# Patient Record
Sex: FEMALE | Race: WHITE | HISPANIC OR LATINO | Employment: FULL TIME | URBAN - METROPOLITAN AREA
[De-identification: names, ages, dates, MRNs, and addresses within clinical notes are randomized per-mention and may not be internally consistent; named-entity substitution may affect disease eponyms.]

---

## 2017-01-27 ENCOUNTER — OFFICE VISIT (OUTPATIENT)
Dept: OBSTETRICS AND GYNECOLOGY | Facility: CLINIC | Age: 57
End: 2017-01-27
Payer: COMMERCIAL

## 2017-01-27 VITALS
WEIGHT: 162.94 LBS | SYSTOLIC BLOOD PRESSURE: 120 MMHG | BODY MASS INDEX: 28.87 KG/M2 | HEIGHT: 63 IN | DIASTOLIC BLOOD PRESSURE: 70 MMHG

## 2017-01-27 DIAGNOSIS — Z01.419 ENCOUNTER FOR GYNECOLOGICAL EXAMINATION: Primary | ICD-10-CM

## 2017-01-27 DIAGNOSIS — Z12.4 PAP SMEAR FOR CERVICAL CANCER SCREENING: ICD-10-CM

## 2017-01-27 DIAGNOSIS — Z12.39 BREAST CANCER SCREENING: ICD-10-CM

## 2017-01-27 PROCEDURE — 88175 CYTOPATH C/V AUTO FLUID REDO: CPT

## 2017-01-27 PROCEDURE — 99999 PR PBB SHADOW E&M-NEW PATIENT-LVL III: CPT | Mod: PBBFAC,,, | Performed by: OBSTETRICS & GYNECOLOGY

## 2017-01-27 PROCEDURE — 99386 PREV VISIT NEW AGE 40-64: CPT | Mod: S$GLB,,, | Performed by: OBSTETRICS & GYNECOLOGY

## 2017-01-27 RX ORDER — CARBAMAZEPINE 100 MG/1
TABLET, EXTENDED RELEASE ORAL
COMMUNITY
Start: 2016-12-28 | End: 2017-02-03 | Stop reason: SDUPTHER

## 2017-01-27 RX ORDER — CARBAMAZEPINE 100 MG/1
100 CAPSULE, EXTENDED RELEASE ORAL 2 TIMES DAILY
Refills: 0 | COMMUNITY
Start: 2017-01-18 | End: 2018-02-14

## 2017-01-27 RX ORDER — CHLORZOXAZONE 500 MG/1
500 TABLET ORAL 3 TIMES DAILY
Refills: 0 | COMMUNITY
Start: 2016-12-28 | End: 2017-02-13

## 2017-01-27 NOTE — MR AVS SNAPSHOT
"    [facility]  [address] [facility], [address]  [facility] [address]  Phone: [phone]  Fax: [phone]                  Nicole Perez   2017 9:30 AM   Office Visit    Description:  Female : 1960   Provider:  Tiffany Ansari MD   Department:  [facility]           Reason for Visit     Gynecologic Exam                To Do List           Future Appointments        Provider Department Dept Phone    2017 8:40 AM Trae Schafer MD [place] - Cardiology [phone]      Goals (5 Years of Data)     None      [facility]     [facility] On Call Nurse Care Line -  Assistance  Registered nurses in the [facility] On Call Center provide clinical advisement, health education, appointment booking, and other advisory services.  Call for this free service at [phone].             Medications           Message regarding Medications     Verify the changes and/or additions to your medication regime listed below are the same as discussed with your clinician today.  If any of these changes or additions are incorrect, please notify your healthcare provider.             Verify that the below list of medications is an accurate representation of the medications you are currently taking.  If none reported, the list may be blank. If incorrect, please contact your healthcare provider. Carry this list with you in case of emergency.           Current Medications     carbamazepine (CARBATROL) 100 MG CM12 Take 100 mg by mouth 2 (two) times daily.    carbamazepine (TEGRETOL XR) 100 MG 12 hr tablet     chlorzoxazone (PARAFON FORTE) 500 mg Tab Take 500 mg by mouth 3 (three) times daily.           Clinical Reference Information           Vital Signs - Last Recorded  Most recent update: 2017  9:45 AM by Kalia Bennett MA    BP Ht Wt BMI       120/70 5' 3" (1.6 m) 73.9 kg (162 lb 14.7 oz) 28.86 kg/m2       Blood Pressure          Most Recent Value    BP  120/70      Allergies as of " 1/27/2017     No Known Allergies      Immunizations Administered on Date of Encounter - 1/27/2017     None      [facility] Sign-Up     Activating your [facility] account is as easy as 1-2-3!     1) Visit [email or web address], select Sign Up Now, enter this activation code and your date of birth, then select Next.  [email or web address]  Expires: 3/13/2017  9:54 AM      2) Create a username and password to use when you visit [facility] in the future and select a security question in case you lose your password and select Next.    3) Enter your e-mail address and click Sign Up!    Additional Information  If you have questions, please e-mail [email or web address] or call [phone] to talk to our [facility] staff. Remember, [facility] is NOT to be used for urgent needs. For medical emergencies, dial 911.

## 2017-01-27 NOTE — PROGRESS NOTES
HPI: Pt is a 56 y.o.  female who presents for routine annual exam. She has hx of prior supracervical hysterectomy 4 years ago for vaginal bleeding. No hx of abnormal paps.       ROS:  GENERAL: Feeling well overall. Denies fever or chills.   SKIN: Denies rash or lesions.   HEAD: Denies head injury or headache.   NODES: Denies enlarged lymph nodes.   CHEST: Denies chest pain or shortness of breath.   CARDIOVASCULAR: Denies palpitations or left sided chest pain.   ABDOMEN: No abdominal pain, constipation, diarrhea, nausea, vomiting or rectal bleeding.   URINARY: No dysuria, hematuria, or burning on urination.  REPRODUCTIVE: See HPI.   BREASTS: Denies pain, lumps, or nipple discharge.   HEMATOLOGIC: No easy bruisability or excessive bleeding.   MUSCULOSKELETAL: Denies joint pain or swelling.   NEUROLOGIC: Denies syncope or weakness.   PSYCHIATRIC: Denies depression, anxiety or mood swings.    PE:   APPEARANCE: Well nourished, well developed, White female in no acute distress.  NODES: no cervical, supraclavicular, or inguinal lymphadenopathy  BREASTS: Symmetrical, no skin changes or visible lesions. No palpable masses, nipple discharge or adenopathy bilaterally.  ABDOMEN: Soft. No tenderness or masses. No distention. No hernias palpated. No CVA tenderness.  VULVA: No lesions. Normal external female genitalia.  URETHRAL MEATUS: Normal size and location, no lesions, no prolapse.  URETHRA: No masses, tenderness, or prolapse.  VAGINA: Moist. No lesions or lacerations noted. No abnormal discharge present. No odor present.   CERVIX: No lesions or discharge. No cervical motion tenderness.   UTERUS: absent.  ADNEXA: No tenderness. No fullness or masses palpated in the adnexal regions.   ANUS PERINEUM: Normal.      Diagnosis:  1. Encounter for gynecological examination    2. Pap smear for cervical cancer screening    3. Breast cancer screening        Plan:     Orders Placed This Encounter    Mammo Digital Screening Bilat with  Tomosynthesis CAD    Liquid-based pap smear, screening       Patient was counseled today on the new ACS guidelines for cervical cytology screening as well as the current recommendations for breast cancer screening. She was counseled to follow up with her PCP for other routine health maintenance. Counseling session lasted approximately 10 minutes, and all her questions were answered.    Follow-up with me in 1 year for routine exam; pap in 3 years. Pt aware.

## 2017-02-03 ENCOUNTER — OFFICE VISIT (OUTPATIENT)
Dept: INTERNAL MEDICINE | Facility: CLINIC | Age: 57
End: 2017-02-03
Payer: COMMERCIAL

## 2017-02-03 ENCOUNTER — HOSPITAL ENCOUNTER (OUTPATIENT)
Dept: RADIOLOGY | Facility: HOSPITAL | Age: 57
Discharge: HOME | End: 2017-02-03
Attending: OBSTETRICS & GYNECOLOGY
Payer: COMMERCIAL

## 2017-02-03 VITALS
BODY MASS INDEX: 30.02 KG/M2 | SYSTOLIC BLOOD PRESSURE: 128 MMHG | HEART RATE: 86 BPM | DIASTOLIC BLOOD PRESSURE: 60 MMHG | WEIGHT: 163.13 LBS | HEIGHT: 62 IN | TEMPERATURE: 98 F

## 2017-02-03 DIAGNOSIS — Z12.39 BREAST CANCER SCREENING: ICD-10-CM

## 2017-02-03 DIAGNOSIS — M62.838 MUSCLE SPASM: ICD-10-CM

## 2017-02-03 DIAGNOSIS — Z11.59 NEED FOR HEPATITIS C SCREENING TEST: ICD-10-CM

## 2017-02-03 DIAGNOSIS — R13.10 DYSPHAGIA, UNSPECIFIED TYPE: ICD-10-CM

## 2017-02-03 DIAGNOSIS — R14.0 BLOATING: ICD-10-CM

## 2017-02-03 DIAGNOSIS — A04.8 H. PYLORI INFECTION: ICD-10-CM

## 2017-02-03 DIAGNOSIS — Z01.00 ROUTINE EYE EXAM: ICD-10-CM

## 2017-02-03 DIAGNOSIS — Z00.00 ANNUAL PHYSICAL EXAM: Primary | ICD-10-CM

## 2017-02-03 DIAGNOSIS — Z12.31 VISIT FOR SCREENING MAMMOGRAM: ICD-10-CM

## 2017-02-03 DIAGNOSIS — M79.89 SWELLING OF LOWER EXTREMITY: ICD-10-CM

## 2017-02-03 PROCEDURE — 99386 PREV VISIT NEW AGE 40-64: CPT | Mod: S$GLB,,, | Performed by: INTERNAL MEDICINE

## 2017-02-03 PROCEDURE — 99999 PR PBB SHADOW E&M-EST. PATIENT-LVL IV: CPT | Mod: PBBFAC,,, | Performed by: INTERNAL MEDICINE

## 2017-02-03 PROCEDURE — 77063 BREAST TOMOSYNTHESIS BI: CPT | Mod: 26,,, | Performed by: RADIOLOGY

## 2017-02-03 PROCEDURE — 77067 SCR MAMMO BI INCL CAD: CPT | Mod: 26,,, | Performed by: RADIOLOGY

## 2017-02-03 PROCEDURE — 77067 SCR MAMMO BI INCL CAD: CPT | Mod: TC

## 2017-02-03 NOTE — PROGRESS NOTES
Subjective:       Patient ID: Nicole Perez is a 56 y.o. female.    Chief Complaint: Annual Exam    HPI     Patient is a 56 year old female here today for annual physical exam.      1. Feet Swelling:  Bilateral foot swelling  BP controlled  Does not monitor sodium intake  She says she saw a vascular MD in [place] who says she some valvular problem in the veins and wants to follow up. She has doppler which was negative for DVT  Also wants compression hose Rx    2. Muscle Spasm:  Diffuse muscle spasms for years. She saw neurologist in [facility], s/p CT and MRI reportedly normal. No known cause. She is currently on 2 meds on the MAR for this problem.     3. Abdominal Bloating:  S/p cholecystectomy  Lactose intolerant  Normal BM for her is once a day, soft, brown  No diarrhea, no blood in stool, no GERD, no nausea/vomiting  S/p EGD and Colonoscopy, says she had some polyps and also H.Pylori and was treated for this. She would like to see GI for this problem    3. Dysphagia:  Reports that one time while in the shower, water entered her mouth causing a laryngospasm which then resolved but since then she has intermittent episodes with aspiration during the night, choking on her saliva. Wants evaluation for this.      Health Maintenance:  Cholesterol: (q5yr>19yo) needs   Vaccines: Influenza (yearly) declines ; Tetanus (every 10 yrs - 1st tdap) declines   Sexual Screening:    STD screening: no concern  Eye exam: OTC reading glasses; last eye exam was long time ago  Mammogram: (to age 75yo) needs   Gyn exam: last pap smear  - normal   Colonoscopy: due now again       Exercise: no regular exercise   Diet: no restrictions     Risk factors    Past Medical History   Diagnosis Date    Abnormal Pap smear of cervix      Past Surgical History   Procedure Laterality Date    Hysterectomy       section      Breast reduction Bilateral     Cholecystectomy      Tummy tuck Bilateral      Social History      Social History    Marital status:      Spouse name: N/A    Number of children: N/A    Years of education: N/A     Occupational History    Not on file.     Social History Main Topics    Smoking status: Former Smoker    Smokeless tobacco: Never Used    Alcohol use No    Drug use: No    Sexual activity: Yes     Partners: Male     Birth control/ protection: See Surgical Hx     Other Topics Concern    Not on file     Social History Narrative     Review of patient's allergies indicates:  No Known Allergies  Ms. Perez had no medications administered during this visit.          Review of Systems   Constitutional: Negative for chills, fatigue and fever.   HENT: Positive for trouble swallowing. Negative for congestion, ear pain, postnasal drip, rhinorrhea, sinus pressure and sore throat.    Eyes: Negative for itching and visual disturbance.   Respiratory: Negative for cough, shortness of breath and wheezing.    Cardiovascular: Negative for chest pain, palpitations and leg swelling.   Gastrointestinal: Negative for abdominal pain and nausea.   Genitourinary: Negative for dysuria.   Musculoskeletal: Negative for arthralgias and myalgias.   Skin: Negative for rash.   Neurological: Negative for weakness, light-headedness and headaches.       Objective:      Physical Exam   Constitutional: She is oriented to person, place, and time. She appears well-developed and well-nourished. No distress.   HENT:   Head: Normocephalic and atraumatic.   Mouth/Throat: Oropharynx is clear and moist. No oropharyngeal exudate.   Eyes: Conjunctivae and EOM are normal. Pupils are equal, round, and reactive to light. Right eye exhibits no discharge. Left eye exhibits no discharge.   Neck: Normal range of motion. Neck supple. No thyromegaly present.   Cardiovascular: Normal rate, regular rhythm and normal heart sounds.    No murmur heard.  Pulmonary/Chest: Effort normal and breath sounds normal. No respiratory distress. She has  no wheezes. She has no rales.   Abdominal: Soft. She exhibits no distension. There is no tenderness.   Musculoskeletal: She exhibits no edema.   Lymphadenopathy:     She has no cervical adenopathy.   Neurological: She is alert and oriented to person, place, and time.   Skin: Skin is warm and dry. She is not diaphoretic.   Nursing note and vitals reviewed.      Assessment:       1. Annual physical exam    2. Muscle spasm    3. Need for hepatitis C screening test    4. Routine eye exam    5. Bloating    6. H. pylori infection    7. Dysphagia, unspecified type    8. Swelling of lower extremity        Plan:       Labs ordered today  1. Compression Hose Rx given  2. Vascular Med Referral   3. GI referral for evaluation for eradication of bloating and to assist with oropharyngeal dysphagia  - MBSS ordered  4. Optometry referral ordered  Sign to [facility]       To Do for Next visit:  1. Tetanus Vaccine    RTC 1 year or sooner if needed

## 2017-02-06 ENCOUNTER — TELEPHONE (OUTPATIENT)
Dept: SPEECH THERAPY | Facility: HOSPITAL | Age: 57
End: 2017-02-06

## 2017-02-13 ENCOUNTER — OFFICE VISIT (OUTPATIENT)
Dept: GASTROENTEROLOGY | Facility: CLINIC | Age: 57
End: 2017-02-13
Payer: COMMERCIAL

## 2017-02-13 VITALS
DIASTOLIC BLOOD PRESSURE: 77 MMHG | BODY MASS INDEX: 30.02 KG/M2 | HEART RATE: 81 BPM | SYSTOLIC BLOOD PRESSURE: 137 MMHG | WEIGHT: 163.13 LBS | HEIGHT: 62 IN

## 2017-02-13 DIAGNOSIS — R14.0 ABDOMINAL BLOATING: Primary | ICD-10-CM

## 2017-02-13 DIAGNOSIS — T17.308A CHOKING, INITIAL ENCOUNTER: ICD-10-CM

## 2017-02-13 PROCEDURE — 99204 OFFICE O/P NEW MOD 45 MIN: CPT | Mod: S$GLB,,, | Performed by: INTERNAL MEDICINE

## 2017-02-13 PROCEDURE — 99999 PR PBB SHADOW E&M-EST. PATIENT-LVL III: CPT | Mod: PBBFAC,,, | Performed by: INTERNAL MEDICINE

## 2017-02-13 RX ORDER — OMEPRAZOLE 40 MG/1
40 CAPSULE, DELAYED RELEASE ORAL DAILY
Qty: 30 CAPSULE | Refills: 11 | Status: SHIPPED | OUTPATIENT
Start: 2017-02-13 | End: 2018-02-05 | Stop reason: SDUPTHER

## 2017-02-13 NOTE — PROGRESS NOTES
Subjective:       Patient ID: Nicole Perez is a 56 y.o. female.    Chief Complaint: Bloated    This is a 56-year-old female presents for evaluation of upper GI symptoms.  She has noted some choking sensations which occur in the middle the night described as a coughing sensation of refluxing.  She had an upper endoscopy with what sounds to be a dilation of a cricopharyngeal bar.  These records are not available at this time.  Intermittent reflux is noted which is dietary-dependent, mild to moderate intensity without other exacerbating or relieving factors.  Coffee does make it worse.  She does not take chronic acid suppression.  No chest pain, distention exertion.  No NSAIDs.  She reports a history of a colonoscopy 3 years ago with removal of foreign 6 polyps, follow-up due at this time.    The following portions of the patient's history were reviewed and updated as appropriate: allergies, current medications, past family history, past medical history, past social history, past surgical history and problem list.    (Portions of this note were dictated using voice recognition software and may contain dictation related errors in spelling/grammar/syntax not found on text review)    HPI  Review of Systems   Constitutional: Negative for appetite change, chills and fever.   HENT: Negative for postnasal drip and trouble swallowing.    Eyes: Negative for pain and redness.   Respiratory: Positive for choking. Negative for cough, chest tightness and shortness of breath.    Cardiovascular: Negative for chest pain and leg swelling.   Gastrointestinal: Negative for abdominal distention, abdominal pain, anal bleeding, blood in stool, constipation, diarrhea, nausea, rectal pain and vomiting.   Endocrine: Negative for cold intolerance and heat intolerance.   Genitourinary: Negative for difficulty urinating and hematuria.   Musculoskeletal: Negative for arthralgias and back pain.   Skin: Negative for color change and pallor.    Allergic/Immunologic: Negative for environmental allergies and food allergies.   Neurological: Negative for dizziness and light-headedness.   Hematological: Negative for adenopathy. Does not bruise/bleed easily.   Psychiatric/Behavioral: Negative for agitation and behavioral problems.       Objective:      Physical Exam   Constitutional: She is oriented to person, place, and time. She appears well-developed and well-nourished. No distress.   HENT:   Head: Normocephalic and atraumatic.   Eyes: Conjunctivae are normal. No scleral icterus.   Neck: Normal range of motion. Neck supple. No tracheal deviation present. No thyromegaly present.   Cardiovascular: Normal rate and regular rhythm.  Exam reveals no gallop and no friction rub.    No murmur heard.  Pulmonary/Chest: Effort normal and breath sounds normal. No respiratory distress. She has no wheezes.   Abdominal: Soft. Bowel sounds are normal. She exhibits no distension. There is no tenderness.   Musculoskeletal:        Right wrist: She exhibits normal range of motion and no tenderness.        Left wrist: She exhibits normal range of motion and no tenderness.   Lymphadenopathy:        Head (right side): No submental and no submandibular adenopathy present.        Head (left side): No submental and no submandibular adenopathy present.   Neurological: She is alert and oriented to person, place, and time.   Skin: Skin is warm and dry. No rash noted. She is not diaphoretic. No erythema.   Psychiatric: She has a normal mood and affect. Her behavior is normal.   Nursing note and vitals reviewed.      Labs: ordered  Assessment:       1. Abdominal bloating        Plan:   1. Trial of PPI  2. F/u swallow study  3. Colonoscopy in the near future for surveillance  4. F/u 4-6 weeks

## 2017-02-13 NOTE — LETTER
February 13, 2017      Jhoana Goodwin MD  [address] [facility] [place] [address]           [facility] Gastroenterology  [address]  [address]  [facility] [address]  Phone: [phone]          Patient: Nicole Perez   MR Number: [id number]   YOB: 1960   Date of Visit: 2/13/2017       Dear Dr. Jhoana Goodwin:    Thank you for referring Nicole Perez to me for evaluation. Attached you will find relevant portions of my assessment and plan of care.    If you have questions, please do not hesitate to call me. I look forward to following Nicole Perez along with you.    Sincerely,    Angela Staples MD    Enclosure  CC:  No Recipients    If you would like to receive this communication electronically, please contact [email or web address] or [phone] to request more information on [employer] access.    For providers and/or their staff who would like to refer a patient to [facility], please contact us through our one-stop-shop provider referral line, [facility] , at [phone].    If you feel you have received this communication in error or would no longer like to receive these types of communications, please e-mail [email or web address]

## 2017-02-14 ENCOUNTER — INITIAL CONSULT (OUTPATIENT)
Dept: CARDIOLOGY | Facility: CLINIC | Age: 57
End: 2017-02-14
Payer: COMMERCIAL

## 2017-02-14 VITALS
BODY MASS INDEX: 29.62 KG/M2 | SYSTOLIC BLOOD PRESSURE: 122 MMHG | HEART RATE: 81 BPM | DIASTOLIC BLOOD PRESSURE: 76 MMHG | WEIGHT: 160.94 LBS | HEIGHT: 62 IN

## 2017-02-14 DIAGNOSIS — Z87.891 HISTORY OF SMOKING: ICD-10-CM

## 2017-02-14 DIAGNOSIS — M79.606 PAIN OF LOWER EXTREMITY, UNSPECIFIED LATERALITY: ICD-10-CM

## 2017-02-14 DIAGNOSIS — I83.893 VARICOSE VEINS OF BOTH LEGS WITH EDEMA: Primary | ICD-10-CM

## 2017-02-14 PROCEDURE — 99203 OFFICE O/P NEW LOW 30 MIN: CPT | Mod: S$GLB,,, | Performed by: INTERNAL MEDICINE

## 2017-02-14 PROCEDURE — 99999 PR PBB SHADOW E&M-EST. PATIENT-LVL III: CPT | Mod: PBBFAC,,, | Performed by: INTERNAL MEDICINE

## 2017-02-14 NOTE — LETTER
February 14, 2017      Jhoana Goodwin MD  [address] [facility] [place] [address]           [place] - V[facility]  [address] [facility] [address]  Phone: [phone]          Patient: Nicole Perez   MR Number: [id number]   YOB: 1960   Date of Visit: 2/14/2017       Dear Dr. Jhoana Goodwin:    Thank you for referring Nicole Perez to me for evaluation. Attached you will find relevant portions of my assessment and plan of care.    If you have questions, please do not hesitate to call me. I look forward to following Nicole Perez along with you.    Sincerely,    Windy Waller MD    Enclosure  CC:  No Recipients    If you would like to receive this communication electronically, please contact [email or web address] or [phone] to request more information on [employer] Link access.    For providers and/or their staff who would like to refer a patient to [facility], please contact us through our one-stop-shop provider referral line, [facility], at [phone].    If you feel you have received this communication in error or would no longer like to receive these types of communications, please e-mail [email or web address]

## 2017-02-14 NOTE — MR AVS SNAPSHOT
[place] - Vascular Diseases   [facility]  [place] [address]  Phone: [phone]                  Nicole Perez   2017 4:00 PM   Initial consult    Description:  Female : 1960   Provider:  Windy Waller MD   Department:  [place] - [facility] Diseases           Reason for Visit     Leg Swelling           Diagnoses this Visit        Comments    Varicose veins of both legs with edema    -  Primary     Pain of lower extremity, unspecified laterality         History of smoking                To Do List           Future Appointments        Provider Department Dept Phone    2/15/2017 8:20 AM Trae Schafer MD [place] - Cardiology [phone]    2017 2:00 PM [facility] [id number] [address] [facility] [phone]    2017 2:00 PM Pam Condon [facility] [phone]    3/7/2017 3:20 PM Jhoana Goodwin MD [place] - Internal Medicine [phone]      Goals (5 Years of Data)     None      Follow-Up and Disposition     Call patient with results Return in about 6 months (around 2017).      [facility]     [facility] Nurse Care Line -  Assistance  Registered nurses in the [facility] Center provide clinical advisement, health education, appointment booking, and other advisory services.  Call for this free service at [phone].             Medications           Message regarding Medications     Verify the changes and/or additions to your medication regime listed below are the same as discussed with your clinician today.  If any of these changes or additions are incorrect, please notify your healthcare provider.             Verify that the below list of medications is an accurate representation of the medications you are currently taking.  If none reported, the list may be blank. If incorrect, please contact your healthcare provider. Carry this list with you in case of emergency.           Current  "Medications     carbamazepine (CARBATROL) 100 MG CM12 Take 100 mg by mouth 2 (two) times daily.    omeprazole (PRILOSEC) 40 MG capsule Take 1 capsule (40 mg total) by mouth once daily.           Clinical Reference Information           Your Vitals Were     BP Pulse Height Weight BMI    122/76 (BP Location: Left arm, Patient Position: Sitting, BP Method: Manual) 81 5' 2" (1.575 m) 73 kg (160 lb 15 oz) 29.44 kg/m2      Blood Pressure          Most Recent Value    BP  122/76      Allergies as of 2/14/2017     No Known Allergies      Immunizations Administered on Date of Encounter - 2/14/2017     None      Orders Placed During Today's Visit      Normal Orders This Visit    COMPRESSION STOCKINGS     Future Labs/Procedures Expected by Expires    CAR Ultrasound doppler venous legs bilat  2/14/2017 (Approximate) 2/14/2018      [facility] Sign-Up     Activating your [facility] account is as easy as 1-2-3!     1) Visit [email or web address], select Sign Up Now, enter this activation code and your date of birth, then select Next.  [email or web address]  Expires: 3/13/2017  9:54 AM      2) Create a username and password to use when you visit [facility] in the future and select a security question in case you lose your password and select Next.    3) Enter your e-mail address and click Sign Up!    Additional Information  If you have questions, please e-mail [email or web address] or call [phone] to talk to our [facility] staff. Remember, [facility] is NOT to be used for urgent needs. For medical emergencies, dial 911.         Language Assistance Services     ATTENTION: Language assistance services are available, free of charge. Please call [phone].      ATENCIÓN: Si habla español, tiene a van disposición servicios gratuitos de asistencia lingüística. Llame al [phone].     [facility] Ý: N?u b?n nói Ti?ng Vi?t, có các d?ch v? h? tr? ngôn ng? mi?n phí dành cho b?n. G?i s? [phone].         [place] [facility] complies with " applicable Federal civil rights laws and does not discriminate on the basis of race, color, national origin, age, disability, or sex.

## 2017-02-14 NOTE — PROGRESS NOTES
Subjective:    Patient ID:  Nicole Perez is a 56 y.o. Y.o.female who presents for evaluation of Varicose veins.      HPI: 56 year old female who had history of varicose veins for 8 years. Presents today to [facility] care. She reports swelling worse at night, muscle tightness.. She can walk with no limitations, she never used a compression stocking.no wounds, ulcers, or skin discoloration.    Past Medical History   Diagnosis Date    Abnormal Pap smear of cervix        indicated that her mother is . She indicated that her father is .     Social History     Social History    Marital status:      Spouse name: N/A    Number of children: N/A    Years of education: N/A     Occupational History    Not on file.     Social History Main Topics    Smoking status: Former Smoker    Smokeless tobacco: Never Used    Alcohol use No    Drug use: No    Sexual activity: Yes     Partners: Male     Birth control/ protection: See Surgical Hx     Other Topics Concern    Not on file     Social History Narrative       Current Outpatient Prescriptions   Medication Sig    carbamazepine (CARBATROL) 100 MG CM12 Take 100 mg by mouth 2 (two) times daily.    omeprazole (PRILOSEC) 40 MG capsule Take 1 capsule (40 mg total) by mouth once daily.     No current facility-administered medications for this visit.        CMP  No results found for: NA, K, CL, CO2, GLU, BUN, CREATININE, CALCIUM, PROT, ALBUMIN, BILITOT, ALKPHOS, AST, ALT, ANIONGAP, ESTGFRAFRICA, EGFRNONAA    No results found for: WBC, HGB, HCT, MCV, PLT    Review of Systems   Constitution: Negative for decreased appetite, fever and weight gain.   HENT: Negative.    Cardiovascular: Positive for leg swelling. Negative for chest pain, claudication and cyanosis.   Respiratory: Negative for cough, shortness of breath and wheezing.    Skin: Negative for color change, dry skin, itching, rash and suspicious lesions.   Musculoskeletal: Positive for myalgias.  "Negative for arthritis, back pain, joint swelling and muscle weakness.   Gastrointestinal: Negative.    Genitourinary: Negative.    Neurological: Negative.  Negative for loss of balance, numbness and paresthesias.        Objective:     Visit Vitals    /76 (BP Location: Left arm, Patient Position: Sitting, BP Method: Manual)    Pulse 81    Ht 5' 2" (1.575 m)    Wt 73 kg (160 lb 15 oz)    BMI 29.44 kg/m2     Physical Exam   Constitutional: She is oriented to person, place, and time. She appears well-developed and well-nourished. No distress.   HENT:   Head: Normocephalic and atraumatic.   Eyes: Conjunctivae and EOM are normal. Pupils are equal, round, and reactive to light.   Neck: Normal range of motion. Neck supple. No JVD present.   Cardiovascular: Normal rate, regular rhythm, normal heart sounds and intact distal pulses.  Exam reveals no gallop and no friction rub.    No murmur heard.  Pulmonary/Chest: Effort normal and breath sounds normal. No respiratory distress. She has no wheezes.   Musculoskeletal: Normal range of motion. She exhibits edema. She exhibits no tenderness.   Neurological: She is alert and oriented to person, place, and time.   Skin: Skin is warm. No rash noted. She is not diaphoretic. No erythema. No pallor.       Assessment:       1. Varicose veins of both legs with edema  CAR Ultrasound doppler venous legs bilat    COMPRESSION STOCKINGS   2. Pain of lower extremity, unspecified laterality  CAR Ultrasound doppler venous legs bilat   3. History of smoking          Plan:       Nicole was seen today for leg swelling.    Diagnoses and all orders for this visit:    Varicose veins of both legs with edema  -     CAR Ultrasound doppler venous legs bilat; Future; Expected date: 2/14/17  -     COMPRESSION STOCKINGS    Pain of lower extremity, unspecified laterality  -     CAR Ultrasound doppler venous legs bilat; Future; Expected date: 2/14/17    Windy Waller                        "

## 2017-02-17 ENCOUNTER — LAB VISIT (OUTPATIENT)
Dept: LAB | Facility: HOSPITAL | Age: 57
End: 2017-02-17
Attending: INTERNAL MEDICINE
Payer: COMMERCIAL

## 2017-02-17 ENCOUNTER — TELEPHONE (OUTPATIENT)
Dept: SPEECH THERAPY | Facility: HOSPITAL | Age: 57
End: 2017-02-17

## 2017-02-17 DIAGNOSIS — R14.0 ABDOMINAL BLOATING: ICD-10-CM

## 2017-02-17 PROCEDURE — 87338 HPYLORI STOOL AG IA: CPT

## 2017-02-21 ENCOUNTER — OFFICE VISIT (OUTPATIENT)
Dept: INTERNAL MEDICINE | Facility: CLINIC | Age: 57
End: 2017-02-21
Payer: COMMERCIAL

## 2017-02-21 ENCOUNTER — TELEPHONE (OUTPATIENT)
Dept: INTERNAL MEDICINE | Facility: CLINIC | Age: 57
End: 2017-02-21

## 2017-02-21 VITALS
WEIGHT: 163.13 LBS | BODY MASS INDEX: 28.9 KG/M2 | DIASTOLIC BLOOD PRESSURE: 86 MMHG | SYSTOLIC BLOOD PRESSURE: 144 MMHG | RESPIRATION RATE: 16 BRPM | TEMPERATURE: 98 F | HEIGHT: 63 IN | HEART RATE: 92 BPM

## 2017-02-21 DIAGNOSIS — R73.03 PREDIABETES: ICD-10-CM

## 2017-02-21 DIAGNOSIS — Z87.448 HISTORY OF HEMATURIA: Primary | ICD-10-CM

## 2017-02-21 DIAGNOSIS — E78.00 HYPERCHOLESTEREMIA: ICD-10-CM

## 2017-02-21 DIAGNOSIS — R79.89 ELEVATED LIVER FUNCTION TESTS: ICD-10-CM

## 2017-02-21 LAB — H PYLORI AG STL QL: NOT DETECTED

## 2017-02-21 PROCEDURE — 1160F RVW MEDS BY RX/DR IN RCRD: CPT | Mod: S$GLB,,, | Performed by: INTERNAL MEDICINE

## 2017-02-21 PROCEDURE — 99213 OFFICE O/P EST LOW 20 MIN: CPT | Mod: S$GLB,,, | Performed by: INTERNAL MEDICINE

## 2017-02-21 PROCEDURE — 99999 PR PBB SHADOW E&M-EST. PATIENT-LVL III: CPT | Mod: PBBFAC,,, | Performed by: INTERNAL MEDICINE

## 2017-02-21 RX ORDER — PRAVASTATIN SODIUM 20 MG/1
20 TABLET ORAL DAILY
Qty: 30 TABLET | Refills: 11 | Status: SHIPPED | OUTPATIENT
Start: 2017-02-21 | End: 2018-02-14 | Stop reason: SDUPTHER

## 2017-02-21 NOTE — TELEPHONE ENCOUNTER
----- Message from Jhoana Goodwin MD sent at 2/21/2017  9:41 AM CST -----  Please see if patient can have follow up appt with me to discuss:  High cholesterol, prediabetes, elevated liver function testing. Thanks!

## 2017-02-21 NOTE — PROGRESS NOTES
Subjective:       Patient ID: Nicole Perez is a 56 y.o. female.    Chief Complaint: Results    HPI     Patient is a 56-year-old female here today to discuss her abnormal lab results.  Her hemoglobin A1c is 5.8.  Her LFT, including her ALT was slightly elevated at 60.  Her cholesterol levels are grossly elevated, along with a elevated triglyceride level.  Patient thinks that this is all from her diet, feels that there is a lot of changes secondary be made.  She is interested in working on her diet alone to help improve these things.    Review of Systems   Constitutional: Negative for chills, fatigue and fever.   HENT: Negative for congestion, ear pain, postnasal drip, rhinorrhea, sinus pressure and sore throat.    Eyes: Negative for itching and visual disturbance.   Respiratory: Negative for cough, shortness of breath and wheezing.    Cardiovascular: Negative for chest pain, palpitations and leg swelling.   Gastrointestinal: Negative for abdominal pain and nausea.   Genitourinary: Negative for dysuria.   Musculoskeletal: Negative for arthralgias and myalgias.   Skin: Negative for rash.   Neurological: Negative for weakness, light-headedness and headaches.       Objective:      Physical Exam   Constitutional: She is oriented to person, place, and time. She appears well-developed and well-nourished. No distress.   HENT:   Head: Normocephalic and atraumatic.   Neurological: She is alert and oriented to person, place, and time.   Skin: She is not diaphoretic.   Nursing note and vitals reviewed.      Assessment:       1. History of hematuria    2. Hypercholesteremia    3. Elevated liver function tests    4. Prediabetes        Plan:       Check UA and Vitamin D now  Lipid and CMP and A1c in 6 months  Start pravastatin 20 mg daily  Work on dietary changes with low carb/low fat/low sugar diet- reviewed in detail.   RTC 1 year or sooner if needed

## 2017-02-23 ENCOUNTER — TELEPHONE (OUTPATIENT)
Dept: SPEECH THERAPY | Facility: HOSPITAL | Age: 57
End: 2017-02-23

## 2017-02-23 LAB
ALBUMIN SERPL-MCNC: 4.3 G/DL (ref 3.6–5.1)
ALBUMIN/GLOB SERPL: 1.4 (CALC) (ref 1–2.5)
ALP SERPL-CCNC: 91 U/L (ref 33–130)
ALT SERPL-CCNC: 60 U/L (ref 6–29)
AST SERPL-CCNC: 29 U/L (ref 10–35)
BASOPHILS # BLD AUTO: 28 CELLS/UL (ref 0–200)
BASOPHILS NFR BLD AUTO: 0.4 %
BILIRUB SERPL-MCNC: 0.6 MG/DL (ref 0.2–1.2)
BUN SERPL-MCNC: 12 MG/DL (ref 7–25)
BUN/CREAT SERPL: ABNORMAL (CALC) (ref 6–22)
CALCIUM SERPL-MCNC: 9.7 MG/DL (ref 8.6–10.4)
CHLORIDE SERPL-SCNC: 105 MMOL/L (ref 98–110)
CHOLEST SERPL-MCNC: 325 MG/DL (ref 125–200)
CHOLEST/HDLC SERPL: 7.7 (CALC)
CO2 SERPL-SCNC: 25 MMOL/L (ref 20–31)
CREAT SERPL-MCNC: 0.65 MG/DL (ref 0.5–1.05)
EOSINOPHIL # BLD AUTO: 112 CELLS/UL (ref 15–500)
EOSINOPHIL NFR BLD AUTO: 1.6 %
ERYTHROCYTE [DISTWIDTH] IN BLOOD BY AUTOMATED COUNT: 13.6 % (ref 11–15)
GFR SERPL CREATININE-BSD FRML MDRD: 101 ML/MIN/1.73M2
GLOBULIN SER CALC-MCNC: 3 G/DL (CALC) (ref 1.9–3.7)
GLUCOSE SERPL-MCNC: 110 MG/DL (ref 65–99)
HBA1C MFR BLD: 5.8 % OF TOTAL HGB
HCT VFR BLD AUTO: 38.6 % (ref 35–45)
HDLC SERPL-MCNC: 42 MG/DL
HGB BLD-MCNC: 12.6 G/DL (ref 11.7–15.5)
LDLC SERPL CALC-MCNC: 223 MG/DL (CALC)
LYMPHOCYTES # BLD AUTO: 3066 CELLS/UL (ref 850–3900)
LYMPHOCYTES NFR BLD AUTO: 43.8 %
MCH RBC QN AUTO: 28.2 PG (ref 27–33)
MCHC RBC AUTO-ENTMCNC: 32.6 G/DL (ref 32–36)
MCV RBC AUTO: 86.4 FL (ref 80–100)
MONOCYTES # BLD AUTO: 483 CELLS/UL (ref 200–950)
MONOCYTES NFR BLD AUTO: 6.9 %
NEUTROPHILS # BLD AUTO: 3311 CELLS/UL (ref 1500–7800)
NEUTROPHILS NFR BLD AUTO: 47.3 %
NONHDLC SERPL-MCNC: 283 MG/DL (CALC)
PLATELET # BLD AUTO: 261 THOUSAND/UL (ref 140–400)
PMV BLD REES-ECKER: 7.5 FL (ref 7.5–12.5)
POTASSIUM SERPL-SCNC: 4.3 MMOL/L (ref 3.5–5.3)
PROT SERPL-MCNC: 7.3 G/DL (ref 6.1–8.1)
RBC # BLD AUTO: 4.47 MILLION/UL (ref 3.8–5.1)
SODIUM SERPL-SCNC: 139 MMOL/L (ref 135–146)
TRIGL SERPL-MCNC: 301 MG/DL
TSH SERPL-ACNC: 1.56 MIU/L
VIT B1 BLD-SCNC: 133 NMOL/L (ref 78–185)
VIT B12 SERPL-MCNC: 346 PG/ML (ref 200–1100)
VIT B6 SERPL-MCNC: 30.5 NG/ML (ref 2.1–21.7)
WBC # BLD AUTO: 7 THOUSAND/UL (ref 3.8–10.8)

## 2017-02-24 ENCOUNTER — TELEPHONE (OUTPATIENT)
Dept: GASTROENTEROLOGY | Facility: CLINIC | Age: 57
End: 2017-02-24

## 2017-02-24 NOTE — TELEPHONE ENCOUNTER
Spoke with patient to inform her of negative h.pylori test. Patient verbalized understanding.    ----- Message from Angela Staples MD sent at 2/24/2017  8:49 AM CST -----  H.pylori is negative, await swallow study

## 2017-02-27 ENCOUNTER — CLINICAL SUPPORT (OUTPATIENT)
Dept: CARDIOLOGY | Facility: CLINIC | Age: 57
End: 2017-02-27
Payer: COMMERCIAL

## 2017-02-27 DIAGNOSIS — M79.606 PAIN OF LOWER EXTREMITY, UNSPECIFIED LATERALITY: ICD-10-CM

## 2017-02-27 DIAGNOSIS — I83.893 VARICOSE VEINS OF BOTH LEGS WITH EDEMA: ICD-10-CM

## 2017-02-27 PROCEDURE — 93970 EXTREMITY STUDY: CPT | Mod: S$GLB,,, | Performed by: INTERNAL MEDICINE

## 2017-03-01 ENCOUNTER — TELEPHONE (OUTPATIENT)
Dept: CARDIOLOGY | Facility: CLINIC | Age: 57
End: 2017-03-01

## 2017-03-01 NOTE — TELEPHONE ENCOUNTER
----- Message from Windy Waller MD sent at 3/1/2017  8:00 AM CST -----  Venous doppler shows evidence of varicose veins in both legs.  Plan: use compression stocking   Follow up in 4 months.

## 2017-03-01 NOTE — TELEPHONE ENCOUNTER
Spoke with patient in regards to venous doppler results.    Advised patient that venous doppler shows evidence of varicose veins in both legs.   Plan: Use compression stocking   Follow up in 4 months.    Patient has been scheduled for follow up.    Patient verbalized understanding.

## 2017-03-14 ENCOUNTER — CLINICAL SUPPORT (OUTPATIENT)
Dept: SPEECH THERAPY | Facility: HOSPITAL | Age: 57
End: 2017-03-14
Attending: INTERNAL MEDICINE
Payer: COMMERCIAL

## 2017-03-14 ENCOUNTER — HOSPITAL ENCOUNTER (OUTPATIENT)
Dept: RADIOLOGY | Facility: HOSPITAL | Age: 57
Discharge: HOME | End: 2017-03-14
Attending: INTERNAL MEDICINE
Payer: COMMERCIAL

## 2017-03-14 DIAGNOSIS — T17.308A CHOKING, INITIAL ENCOUNTER: Primary | ICD-10-CM

## 2017-03-14 DIAGNOSIS — J38.5 LARYNGOSPASM: ICD-10-CM

## 2017-03-14 DIAGNOSIS — R13.10 DYSPHAGIA, UNSPECIFIED TYPE: ICD-10-CM

## 2017-03-14 PROCEDURE — 92611 MOTION FLUOROSCOPY/SWALLOW: CPT | Mod: GN | Performed by: SPEECH-LANGUAGE PATHOLOGIST

## 2017-03-14 PROCEDURE — 74230 X-RAY XM SWLNG FUNCJ C+: CPT | Mod: TC

## 2017-03-14 PROCEDURE — 74230 X-RAY XM SWLNG FUNCJ C+: CPT | Mod: 26,,, | Performed by: RADIOLOGY

## 2017-03-14 NOTE — MR AVS SNAPSHOT
[facility]  [address]  [facility] [address]  Phone: [phone]                  Nicole Perez   3/14/2017 2:30 PM   Clinical Support    Description:  Female : 1960   Provider:  Yvonne José, PhD   Department:  [facility]           Reason for Visit     SLP Initial Evaluation           Diagnoses this Visit        Comments    Choking, initial encounter    -  Primary     Laryngospasm         Dysphagia, unspecified type     REFERRAL DIAGNOSIS           To Do List           Future Appointments        Provider Department Dept Phone    3/21/2017 3:20 PM MD [facility] - Internal Medicine [phone]    2017 4:30 PM MD [facility] - Vascular Diseases [phone]      Goals (5 Years of Data)     None      [facility]     [facility] - / Assistance  Registered nurses in the [facility] provide clinical advisement, health education, appointment booking, and other advisory services.  Call for this free service at [phone].             Medications           Message regarding Medications     Verify the changes and/or additions to your medication regime listed below are the same as discussed with your clinician today.  If any of these changes or additions are incorrect, please notify your healthcare provider.             Verify that the below list of medications is an accurate representation of the medications you are currently taking.  If none reported, the list may be blank. If incorrect, please contact your healthcare provider. Carry this list with you in case of emergency.           Current Medications     carbamazepine (CARBATROL) 100 MG CM12 Take 100 mg by mouth 2 (two) times daily.    omeprazole (PRILOSEC) 40 MG capsule Take 1 capsule (40 mg total) by mouth once daily.    pravastatin (PRAVACHOL) 20 MG tablet Take 1 tablet (20 mg total) by mouth once daily.          "  Clinical Reference Information           Allergies as of 3/14/2017     No Known Allergies      Immunizations Administered on Date of Encounter - 3/14/2017     None      Orders Placed During Today's Visit      Normal Orders This Visit    SLP video swallow       [facility] Sign-Up     Activating your [facility] account is as easy as 1-2-3!     1) Visit [email or web address] select Sign Up Now, enter this activation code and your date of birth, then select Next.  [email or web address]  Expires: 4/28/2017  7:55 PM      2) Create a username and password to use when you visit [facility] in the future and select a security question in case you lose your password and select Next.    3) Enter your e-mail address and click Sign Up!    Additional Information  If you have questions, please e-mail [email or web address] or call [phone] to talk to our [facility] staff. Remember, [facility] is NOT to be used for urgent needs. For medical emergencies, dial 911.         Instructions    ASSESSMENT/IMPRESSIONS:   1. DIAGNOSIS: Patient report of swallowing problems with report of inability to breathe for what seemed like a long time when she had water get in her mouth then throat and airway 1 time when showering (with subsequent description of that as laryngospasm by the referring physician) and the occurrence 6-7 times over a 3-4 year period (with 2 times happening about 3-4 weeks ago) when she has awakened unable to breathe when "saliva" has gone down the wrong way when she is sleeping (with the inability to breathe eventually followed by extended choking/coughing trying to get the water or "saliva" out of her airway).   She has felt a sense of panic when she cannot breathe for what seems like a long time. The modified barium swallow study results were all within normal limits.    The possible ETIOLOGY of the two concerns about swallowing might be different: A) The water that rapidly went into her airway 1 time when showering could have been " "due to decreased attentiveness (even if perhaps only for a split second) to the potential for water to enter the airway when the mouth was open when showering and which could then trigger the vocal folds (vocal cords) to close as they should to protect the airway when water rapidly entered the throat then upper airway; she could have also had her head tilted upward and/or backward when that happened to keep water out of her eyes or to rinse her hair and that position of the head is associated sometimes with less protection of the airway (and higher frequency that something that went in the throat will then go down the wrong way); B) consider reflux as a cause of the problems that occur when the patient is sleeping since she gives a history of occasional heartburn and reflux; she sleeps on 1 pillow "near flat" in bed at night and that could put her at risk of gastroesophageal reflux extending to laryngopharyngeal reflux that results in her feeling of secretions going down the wrong way (since reflux can feel to some patients like saliva or mucus); or, C) to be determined after further medical follow-up.    2. Risk of aspiration related illness from oropharyngeal swallowing is not likely increased based on the modified barium swallow study results, what the patient reports re: her symptoms, and no history reported of an aspiration-related illness.    3. The patient's functional oropharyngeal swallowing was rated as follows:     Current status:  LEVEL 7: The individual's ability to eat independently is not limited by swallow function.  Swallowing should be safe and efficient for all consistencies as long as the patient was using general safe swallowing strategies for an adult with no known cognitive problems. Compensatory strategies are effectively used when needed.   - CH  Projected status:  LEVEL 7: The individual's ability to eat independently is not limited by swallow function.  Swallowing should be safe and " efficient for all consistencies as long as the patient was using general safe swallowing strategies for an adult with no known cognitive problems. Compensatory strategies are effectively used when needed.    -   Discharge status:LEVEL 7: The individual's ability to eat independently is not limited by swallow function.  Swallowing should be safe and efficient for all consistencies as long as the patient was using general safe swallowing strategies for an adult with no known cognitive problems. Compensatory strategies are effectively used when needed.    -  CH     PLAN/RECOMMENDATIONS:   1. Continue oral intake of any consistency liquid (including thin liquids) and food (including pureed to regular solid folds) as preferred and tolerated by the patient. Use safe swallowing strategies including the following: Be mindful/attentive when swallowing, observe an upright posture during and for at least an hour after oral intake (or for greater or less time based on physician recommendation), when possible avoid oral intake when the head is tilted upward/backward (e.g., lower the head to a level or chin-tucked position before swallowing), take one average size bite or sip at at time before putting another in the mouth, do not inhale/talk/laugh/or otherwise use the voice when swallowing as that might cause something swallowed to be inhaled into the airway, observe a moderate rate of oral intake, use extra caution with straws and when having oral intake of mixed consistencies (e.g., soup broth with food in the soup, cereal/milk, pills/liquids, etc.).  Cough out anytime it seems like something has gone down the wrong way when eating/drinking or when she feels like saliva has gone down the wrong way, if there is a wet/gurgly sound to the voice, and/or if coughing occurs spontaneously when swallowing.    2. Observe general aspiration precautions and monitoring. Seek urgent/emergency medical care if there are any new or  unexplained adverse changes in health or well-being including problems with breathing (e.g., shortness of breath, increased effort breathing, discomfort/pain when breathing, inability to breathe), temperature (e.g., elevated temperature/fever), wakefulness/energy (e.g., unexplained increased sleepiness or fatigue), cognition (e.g., decreased clarity of thinking; increased confusion), and/or other factors that suggest medical care could be needed urgently or on an emergency basis (call 911) because of something aspirated into the airway when swallowing.    3. The patient should contact her primary care physician's office (the referring physician) and/or her gastroenterologist re: the results of this swallow study(which she was informed were normal) and her concerns that saliva has occasionally been seeming to go into her airway at night when she is sleeping followed by her having difficulty breathing then having extended coughing/choking. She was told to ask her physicians if she needs any further evaluation and if they have any other recommendations for what to do when she feels she is having difficulty breathing at these times. She was also told to ask her physician about possible reflux as a cause of her symptoms and if there is value to her observing reflux precautions (e.g., dietary changes [e.g., consider that reflux is sometimes worse after having fatty foods, spicy foods, acidic foods/liquids that contain tomatoes/tomato sauce/citrus fruits/and many other foods/liquids, liquids with caffeine [tea, coffee, sodas, energy drinks], food with caffeine [chocolate, coffee-based food/candy, etc], onions, garlic, mint, mentholated items,  alcohol, etc.], position/elevation when sleeping [e.g., 6-8 inch elevation of the head of bed on bed risers/books/other stable material or appropriate elevation with a wedge pillow from hips to shoulders to head with the wedge pillow put between the mattress and box spring or on the  mattress under the sheets], sleep on back or left side with no excess pressure on compression on the stomach/abdomen from position/clothing/other items, do not drink for 2 hours prior to reclining to sleep and do not eat for 3 hours prior to going to sleep, etc.).    4. The referring physician and the patient's gastroenterologist will be informed of the modified barium swallow study results (per patient request).     5. The patient is aware of the need to be mindful and careful when showering with respect to awareness of head position and mouth opening re: water entering her mouth and possibly going into her airway if she is not careful.      6. Please contact [facility] at [phone] or [phone] if there are questions re: the above or if we can be of additional service to this patient.         Language Assistance Services     ATTENTION: Language assistance services are available, free of charge. Please call [phone].      ATENCIÓN: Si habla español, tiene a van disposición servicios gratuitos de asistencia lingüística. Llame al [phone].     CHÚ Ý: N?u b?n nói Ti?ng Vi?t, có các d?ch v? h? tr? ngôn ng? mi?n phí dành cho b?n. G?i s? [phone].         [facility] complies with applicable Federal civil rights laws and does not discriminate on the basis of race, color, national origin, age, disability, or sex.

## 2017-03-14 NOTE — PROGRESS NOTES
"20 minutes speech pathology services (discussion prior to the swallow study re: issues/concerns about swallowing and explanation of the test, performance of the swallow study, discussion re: review of results and recommendations)    MODIFIED BARIUM SWALLOW STUDY (MBSS): SPEECH PATHOLOGY REPORT     Referred by: Jhoana Goodwin MD, [facility] ([facility])     Reason for Referral: Dysphagia, unspecified type (R13.10).    Subjective: The patient was an alert, attentive, pleasant woman who gave a consistent history of her concerns about her swallowing.  Concerns about swallowing were said to be the following: She had water rapidly enter her mouth then airway when showering 1 time after which she was unable to breathe for what seemed like a long time and then she had choking/coughing for an extended period of time. She has 6-7 times over about 3-4 years awakened unable to breathe because of a sensation of saliva going down the wrong way into her airway after which she finally started choking and coughing it out for what seemed like a long time; the most recent occurrences of this were 2 times in a week about 3-4 weeks ago.    Objective:  Nicole Perez, 56 y.o.,  was seen for a modified barium swallow study (MBSS) on referral from Dr. Goodwin as indicated above. Dr. Goodwin's 02/03/20217 clinic visit note included the following statement (as directly excerpted from that report): "Reports that one time while in the shower, water entered her mouth causing a laryngospasm which then resolved but since then she has intermittent episodes with aspiration during the night, choking on her saliva."    Diet consistency at present is mainly thin liquids and a variety of food consistencies including solid foods.      The patient has not had a previous MBSS. She has not had any dysphagia therapy.    The patient's  history as listed in the [facility] E[facility] includes:     Past Medical History:   Diagnosis Date " "   Abdominal bloating 2017    Abnormal Pap smear of cervix     Elevated liver function tests 2017    Foot swelling 2017    Bilateral    GERD (gastroesophageal reflux disease)     History of colon polyps     History of Helicobacter pylori infection     History of hematuria 2017    Hypercholesteremia 2017    Lactose intolerance 2017    Prediabetes 2017    Spasm of muscle     Diffuse muscle spasms "for many years." On meds for this.     Past Surgical History:   Procedure Laterality Date    breast reduction Bilateral      SECTION      CHOLECYSTECTOMY      HYSTERECTOMY      tummy tuck Bilateral        Please refer to the patient's medical records for additional information re: history.    CURRENT MBSS FINDINGS: The patient was seated upright in a chair while swallowing barium (BA)-laced thin liquids, pureed food, dental soft/solid food (cracker, double-decked crackers with BA spread between them) and a gelatin capsule filled with BA powder and swallowed with water. The speech pathologist and radiologist as well as the Radiology tech were present for the entire exam. Lateral videofluorographic views were obtained. Results:     Oral phase swallowing: Within normal limits (WNL) for all parameters of the oral phase of swallowing.     Pharyngeal phase swallowing: WNL for all parameters of the pharyngeal phase of swallowing including prompt and efficient epiglottic inversion (epiglottic closure) with NO evidence of laryngeal penetration or subglottic/tracheal aspiration. There was NO abnormal pharyngeal residue/pooling/stasis.  Onset of the swallow reflex and velopharyngeal movement and closure were WNL. Base of tongue retraction was WNL as was pharyngeal contraction and laryngeal elevation with anterior hyoid tilt.  Pharyngeal transit time was WNL.     Use of Rosenbeck's 8-Point Penetration-Aspiration Scale revealed the following for THIN LIQUIDS, PUREED FOOD, " "DENTAL SOFT/SOLID FOOD, AND A BA CAPSULE SWALLOWED WITH WATER: Best Score= 1 - Material does not enter airway. Worst Score= 1 - Material does not enter airway.    Cervical Esophageal Phase Swallowing: WNL for movement of all liquids and foods from the hypopharynx through the upper esophageal sphincter and then through the remainder of the cervical esophagus.      ASSESSMENT/IMPRESSIONS:   1. DIAGNOSIS: Patient report of swallowing problems with report of inability to breathe for what seemed like a long time when she had water get in her mouth then throat and airway 1 time when showering (with subsequent description of that as laryngospasm by the referring physician) and the occurrence 6-7 times over a 3-4 year period (with 2 times happening about 3-4 weeks ago) when she has awakened unable to breathe when "saliva" has gone down the wrong way when she is sleeping (with the inability to breathe eventually followed by extended choking/coughing trying to get the water or "saliva" out of her airway).   She has felt a sense of panic when she cannot breathe for what seems like a long time. The modified barium swallow study results were all within normal limits.    The possible ETIOLOGY of the two concerns about swallowing might be different: A) The water that rapidly went into her airway 1 time when showering could have been due to decreased attentiveness (even if perhaps only for a split second) to the potential for water to enter the airway when the mouth was open when showering and which could then trigger the vocal folds (vocal cords) to close as they should to protect the airway when water rapidly entered the throat then upper airway; she could have also had her head tilted upward and/or backward when that happened to keep water out of her eyes or to rinse her hair and that position of the head is associated sometimes with less protection of the airway (and higher frequency that something that went in the throat will " "then go down the wrong way); B) consider reflux as a cause of the problems that occur when the patient is sleeping since she gives a history of occasional heartburn and reflux; she sleeps on 1 pillow "near flat" in bed at night and that could put her at risk of gastroesophageal reflux extending to laryngopharyngeal reflux that results in her feeling of secretions going down the wrong way (since reflux can feel to some patients like saliva or mucus); or, C) to be determined after further medical follow-up.    2. Risk of aspiration related illness from oropharyngeal swallowing is not likely increased based on the modified barium swallow study results, what the patient reports re: her symptoms, and no history reported of an aspiration-related illness.    3. The patient's functional oropharyngeal swallowing was rated as follows:     Current status:  LEVEL 7: The individual's ability to eat independently is not limited by swallow function.  Swallowing should be safe and efficient for all consistencies as long as the patient was using general safe swallowing strategies for an adult with no known cognitive problems. Compensatory strategies are effectively used when needed.   -   Projected status:  LEVEL 7: The individual's ability to eat independently is not limited by swallow function.  Swallowing should be safe and efficient for all consistencies as long as the patient was using general safe swallowing strategies for an adult with no known cognitive problems. Compensatory strategies are effectively used when needed.    -   Discharge status:LEVEL 7: The individual's ability to eat independently is not limited by swallow function.  Swallowing should be safe and efficient for all consistencies as long as the patient was using general safe swallowing strategies for an adult with no known cognitive problems. Compensatory strategies are effectively used when needed.    -       PLAN/RECOMMENDATIONS: "   1. Continue oral intake of any consistency liquid (including thin liquids) and food (including pureed to regular solid folds) as preferred and tolerated by the patient. Use safe swallowing strategies including the following: Be mindful/attentive when swallowing, observe an upright posture during and for at least an hour after oral intake (or for greater or less time based on physician recommendation), when possible avoid oral intake when the head is tilted upward/backward (e.g., lower the head to a level or chin-tucked position before swallowing), take one average size bite or sip at at time before putting another in the mouth, do not inhale/talk/laugh/or otherwise use the voice when swallowing as that might cause something swallowed to be inhaled into the airway, observe a moderate rate of oral intake, use extra caution with straws and when having oral intake of mixed consistencies (e.g., soup broth with food in the soup, cereal/milk, pills/liquids, etc.).  Cough out anytime it seems like something has gone down the wrong way when eating/drinking or when she feels like saliva has gone down the wrong way, if there is a wet/gurgly sound to the voice, and/or if coughing occurs spontaneously when swallowing.    2. Observe general aspiration precautions and monitoring. Seek urgent/emergency medical care if there are any new or unexplained adverse changes in health or well-being including problems with breathing (e.g., shortness of breath, increased effort breathing, discomfort/pain when breathing, inability to breathe), temperature (e.g., elevated temperature/fever), wakefulness/energy (e.g., unexplained increased sleepiness or fatigue), cognition (e.g., decreased clarity of thinking; increased confusion), and/or other factors that suggest medical care could be needed urgently or on an emergency basis (call 911) because of something aspirated into the airway when swallowing.    3. The patient should contact her  primary care physician's office (the referring physician) and/or her gastroenterologist re: the results of this swallow study(which she was informed were normal) and her concerns that saliva has occasionally been seeming to go into her airway at night when she is sleeping followed by her having difficulty breathing then having extended coughing/choking. She was told to ask her physicians if she needs any further evaluation and if they have any other recommendations for what to do when she feels she is having difficulty breathing at these times. She was also told to ask her physician about possible reflux as a cause of her symptoms and if there is value to her observing reflux precautions (e.g., dietary changes [e.g., consider that reflux is sometimes worse after having fatty foods, spicy foods, acidic foods/liquids that contain tomatoes/tomato sauce/citrus fruits/and many other foods/liquids, liquids with caffeine [tea, coffee, sodas, energy drinks], food with caffeine [chocolate, coffee-based food/candy, etc], onions, garlic, mint, mentholated items,  alcohol, etc.], position/elevation when sleeping [e.g., 6-8 inch elevation of the head of bed on bed risers/books/other stable material or appropriate elevation with a wedge pillow from hips to shoulders to head with the wedge pillow put between the mattress and box spring or on the mattress under the sheets], sleep on back or left side with no excess pressure on compression on the stomach/abdomen from position/clothing/other items, do not drink for 2 hours prior to reclining to sleep and do not eat for 3 hours prior to going to sleep, etc.).    4. The referring physician and the patient's gastroenterologist will be informed of the modified barium swallow study results (per patient request).     5. The patient is aware of the need to be mindful and careful when showering with respect to awareness of head position and mouth opening re: water entering her mouth and  possibly going into her airway if she is not careful.      6. Please contact [facility] at [phone] or [phone] if there are questions re: the above or if we can be of additional service to this patient.

## 2017-03-15 ENCOUNTER — TELEPHONE (OUTPATIENT)
Dept: INTERNAL MEDICINE | Facility: CLINIC | Age: 57
End: 2017-03-15

## 2017-03-15 NOTE — PATIENT INSTRUCTIONS
"ASSESSMENT/IMPRESSIONS:   1. DIAGNOSIS: Patient report of swallowing problems with report of inability to breathe for what seemed like a long time when she had water get in her mouth then throat and airway 1 time when showering (with subsequent description of that as laryngospasm by the referring physician) and the occurrence 6-7 times over a 3-4 year period (with 2 times happening about 3-4 weeks ago) when she has awakened unable to breathe when "saliva" has gone down the wrong way when she is sleeping (with the inability to breathe eventually followed by extended choking/coughing trying to get the water or "saliva" out of her airway).   She has felt a sense of panic when she cannot breathe for what seems like a long time. The modified barium swallow study results were all within normal limits.    The possible ETIOLOGY of the two concerns about swallowing might be different: A) The water that rapidly went into her airway 1 time when showering could have been due to decreased attentiveness (even if perhaps only for a split second) to the potential for water to enter the airway when the mouth was open when showering and which could then trigger the vocal folds (vocal cords) to close as they should to protect the airway when water rapidly entered the throat then upper airway; she could have also had her head tilted upward and/or backward when that happened to keep water out of her eyes or to rinse her hair and that position of the head is associated sometimes with less protection of the airway (and higher frequency that something that went in the throat will then go down the wrong way); B) consider reflux as a cause of the problems that occur when the patient is sleeping since she gives a history of occasional heartburn and reflux; she sleeps on 1 pillow "near flat" in bed at night and that could put her at risk of gastroesophageal reflux extending to laryngopharyngeal reflux that results in her feeling of secretions " going down the wrong way (since reflux can feel to some patients like saliva or mucus); or, C) to be determined after further medical follow-up.    2. Risk of aspiration related illness from oropharyngeal swallowing is not likely increased based on the modified barium swallow study results, what the patient reports re: her symptoms, and no history reported of an aspiration-related illness.    3. The patient's functional oropharyngeal swallowing was rated as follows:     Current status:  LEVEL 7: The individual's ability to eat independently is not limited by swallow function.  Swallowing should be safe and efficient for all consistencies as long as the patient was using general safe swallowing strategies for an adult with no known cognitive problems. Compensatory strategies are effectively used when needed.   [facility]  Projected status:  LEVEL 7: The individual's ability to eat independently is not limited by swallow function.  Swallowing should be safe and efficient for all consistencies as long as the patient was using general safe swallowing strategies for an adult with no known cognitive problems. Compensatory strategies are effectively used when needed.    -   Discharge status:LEVEL 7: The individual's ability to eat independently is not limited by swallow function.  Swallowing should be safe and efficient for all consistencies as long as the patient was using general safe swallowing strategies for an adult with no known cognitive problems. Compensatory strategies are effectively used when needed.    -       PLAN/RECOMMENDATIONS:   1. Continue oral intake of any consistency liquid (including thin liquids) and food (including pureed to regular solid folds) as preferred and tolerated by the patient. Use safe swallowing strategies including the following: Be mindful/attentive when swallowing, observe an upright posture during and for at least an hour after oral intake (or for greater or less time  based on physician recommendation), when possible avoid oral intake when the head is tilted upward/backward (e.g., lower the head to a level or chin-tucked position before swallowing), take one average size bite or sip at at time before putting another in the mouth, do not inhale/talk/laugh/or otherwise use the voice when swallowing as that might cause something swallowed to be inhaled into the airway, observe a moderate rate of oral intake, use extra caution with straws and when having oral intake of mixed consistencies (e.g., soup broth with food in the soup, cereal/milk, pills/liquids, etc.).  Cough out anytime it seems like something has gone down the wrong way when eating/drinking or when she feels like saliva has gone down the wrong way, if there is a wet/gurgly sound to the voice, and/or if coughing occurs spontaneously when swallowing.    2. Observe general aspiration precautions and monitoring. Seek urgent/emergency medical care if there are any new or unexplained adverse changes in health or well-being including problems with breathing (e.g., shortness of breath, increased effort breathing, discomfort/pain when breathing, inability to breathe), temperature (e.g., elevated temperature/fever), wakefulness/energy (e.g., unexplained increased sleepiness or fatigue), cognition (e.g., decreased clarity of thinking; increased confusion), and/or other factors that suggest medical care could be needed urgently or on an emergency basis (call 911) because of something aspirated into the airway when swallowing.    3. The patient should contact her primary care physician's office (the referring physician) and/or her gastroenterologist re: the results of this swallow study(which she was informed were normal) and her concerns that saliva has occasionally been seeming to go into her airway at night when she is sleeping followed by her having difficulty breathing then having extended coughing/choking. She was told to ask  her physicians if she needs any further evaluation and if they have any other recommendations for what to do when she feels she is having difficulty breathing at these times. She was also told to ask her physician about possible reflux as a cause of her symptoms and if there is value to her observing reflux precautions (e.g., dietary changes [e.g., consider that reflux is sometimes worse after having fatty foods, spicy foods, acidic foods/liquids that contain tomatoes/tomato sauce/citrus fruits/and many other foods/liquids, liquids with caffeine [tea, coffee, sodas, energy drinks], food with caffeine [chocolate, coffee-based food/candy, etc], onions, garlic, mint, mentholated items,  alcohol, etc.], position/elevation when sleeping [e.g., 6-8 inch elevation of the head of bed on bed risers/books/other stable material or appropriate elevation with a wedge pillow from hips to shoulders to head with the wedge pillow put between the mattress and box spring or on the mattress under the sheets], sleep on back or left side with no excess pressure on compression on the stomach/abdomen from position/clothing/other items, do not drink for 2 hours prior to reclining to sleep and do not eat for 3 hours prior to going to sleep, etc.).    4. The referring physician and the patient's gastroenterologist will be informed of the modified barium swallow study results (per patient request).     5. The patient is aware of the need to be mindful and careful when showering with respect to awareness of head position and mouth opening re: water entering her mouth and possibly going into her airway if she is not careful.      6. Please contact [facility] at [phone] or [phone] if there are questions re: the above or if we can be of additional service to this patient.

## 2017-03-15 NOTE — TELEPHONE ENCOUNTER
----- Message from Jhoana Goodwin MD sent at 3/15/2017  9:10 AM CDT -----  Please let patient know that her swallow study was normal. She should follow up with Gastroenterology for additional issues with reflux and bloating. Thanks

## 2017-06-16 ENCOUNTER — OFFICE VISIT (OUTPATIENT)
Dept: GASTROENTEROLOGY | Facility: CLINIC | Age: 57
End: 2017-06-16
Payer: COMMERCIAL

## 2017-06-16 VITALS — HEART RATE: 86 BPM | DIASTOLIC BLOOD PRESSURE: 70 MMHG | SYSTOLIC BLOOD PRESSURE: 114 MMHG

## 2017-06-16 DIAGNOSIS — Z12.11 COLON CANCER SCREENING: ICD-10-CM

## 2017-06-16 DIAGNOSIS — K21.9 GASTROESOPHAGEAL REFLUX DISEASE, ESOPHAGITIS PRESENCE NOT SPECIFIED: Primary | ICD-10-CM

## 2017-06-16 PROCEDURE — 99999 PR PBB SHADOW E&M-EST. PATIENT-LVL IV: CPT | Mod: PBBFAC,,, | Performed by: INTERNAL MEDICINE

## 2017-06-16 PROCEDURE — 99214 OFFICE O/P EST MOD 30 MIN: CPT | Mod: S$GLB,,, | Performed by: INTERNAL MEDICINE

## 2017-06-16 RX ORDER — CHLORZOXAZONE 500 MG/1
TABLET ORAL
COMMUNITY
Start: 2017-06-14 | End: 2021-12-17

## 2017-06-16 NOTE — PATIENT INSTRUCTIONS
Colonoscopy scheduled for July 21, 2017 with Dr. Staples.  Candida Split instructions explained and given to patient.

## 2017-06-16 NOTE — PROGRESS NOTES
Subjective:       Patient ID: Nicole Perez is a 56 y.o. female.    Chief Complaint: No chief complaint on file.    This is a 56-year-old female presents for a f/u of upper GI symptoms.  She initially noted some choking sensations which occur in the middle the night described as a coughing sensation of refluxing.  She had an upper endoscopy with what sounds to be a dilation of a cricopharyngeal bar. Intermittent reflux is noted which is dietary-dependent, mild to moderate intensity without other exacerbating or relieving factors.  Coffee does make it worse.  She was not on PPI therapy so we started.  Thankfully, no further choking episodes have happened but she does note some postnasal drip.  No chest pain, distention exertion.  No NSAIDs.  She reports a history of a colonoscopy 3 years ago with removal of more than 6 polyps, follow-up due at this time.    The following portions of the patient's history were reviewed and updated as appropriate: allergies, current medications, past family history, past medical history, past social history, past surgical history and problem list.    (Portions of this note were dictated using voice recognition software and may contain dictation related errors in spelling/grammar/syntax not found on text review)    HPI    Review of Systems   Constitutional: Negative for appetite change, chills and fever.   HENT: Negative for postnasal drip.    Eyes: Negative for pain and redness.   Respiratory: Positive for choking. Negative for cough, chest tightness and shortness of breath.    Gastrointestinal: Positive for abdominal distention. Negative for abdominal pain, anal bleeding, blood in stool, nausea, rectal pain and vomiting.   Endocrine: Negative for cold intolerance and heat intolerance.   Genitourinary: Negative for difficulty urinating and hematuria.   Musculoskeletal: Negative for back pain.   Skin: Negative for color change and pallor.   Allergic/Immunologic: Negative for  environmental allergies and food allergies.   Neurological: Negative for dizziness and light-headedness.       Objective:      Physical Exam   Constitutional: She is oriented to person, place, and time. She appears well-developed and well-nourished. No distress.   HENT:   Head: Normocephalic and atraumatic.   Eyes: Conjunctivae are normal. No scleral icterus.   Neck: Normal range of motion. Neck supple. No tracheal deviation present. No thyromegaly present.   Pulmonary/Chest: Effort normal. No respiratory distress.   Abdominal: There is no tenderness.   Musculoskeletal:        Right wrist: She exhibits normal range of motion and no tenderness.        Left wrist: She exhibits normal range of motion and no tenderness.   Lymphadenopathy:        Head (right side): No submental and no submandibular adenopathy present.        Head (left side): No submental and no submandibular adenopathy present.   Neurological: She is alert and oriented to person, place, and time.   Skin: Skin is warm and dry. No rash noted. She is not diaphoretic. No erythema.   Psychiatric: She has a normal mood and affect. Her behavior is normal.   Nursing note and vitals reviewed.      Labs: ordered  Assessment:       1. Gastroesophageal reflux disease, esophagitis presence not specified    2. Colon cancer screening        Plan:   1. Continue PPI  2. Surveillance colonoscopy

## 2017-07-18 ENCOUNTER — TELEPHONE (OUTPATIENT)
Dept: GASTROENTEROLOGY | Facility: CLINIC | Age: 57
End: 2017-07-18

## 2017-07-18 NOTE — TELEPHONE ENCOUNTER
Colonoscopy cancelled for July 21 with Dr. Staples. Left message for patient to offer to reschedule.

## 2017-07-18 NOTE — TELEPHONE ENCOUNTER
----- Message from Kimmie Hamilton sent at 7/18/2017 11:14 AM CDT -----  Contact: Self [phone]  Patient is calling to cancel her procedure. Please advice

## 2018-02-06 RX ORDER — OMEPRAZOLE 40 MG/1
CAPSULE, DELAYED RELEASE ORAL
Qty: 30 CAPSULE | Refills: 11 | Status: SHIPPED | OUTPATIENT
Start: 2018-02-06 | End: 2018-11-01 | Stop reason: SDUPTHER

## 2018-02-12 ENCOUNTER — TELEPHONE (OUTPATIENT)
Dept: GASTROENTEROLOGY | Facility: CLINIC | Age: 58
End: 2018-02-12

## 2018-02-12 NOTE — TELEPHONE ENCOUNTER
----- Message from Kimmie Hamilton sent at 2/12/2018 11:33 AM CST -----  Contact: Self [phone]  Patient is calling to get refills on her medication sent to [facility] [phone]    1. omeprazole (PRILOSEC) 40 MG capsule 90 capsule

## 2018-02-14 ENCOUNTER — OFFICE VISIT (OUTPATIENT)
Dept: INTERNAL MEDICINE | Facility: CLINIC | Age: 58
End: 2018-02-14
Payer: COMMERCIAL

## 2018-02-14 ENCOUNTER — PATIENT MESSAGE (OUTPATIENT)
Dept: INTERNAL MEDICINE | Facility: CLINIC | Age: 58
End: 2018-02-14

## 2018-02-14 ENCOUNTER — HOSPITAL ENCOUNTER (OUTPATIENT)
Dept: RADIOLOGY | Facility: HOSPITAL | Age: 58
Discharge: HOME | End: 2018-02-14
Attending: INTERNAL MEDICINE
Payer: COMMERCIAL

## 2018-02-14 VITALS
DIASTOLIC BLOOD PRESSURE: 73 MMHG | WEIGHT: 156.75 LBS | RESPIRATION RATE: 16 BRPM | SYSTOLIC BLOOD PRESSURE: 116 MMHG | HEIGHT: 63 IN | HEART RATE: 71 BPM | TEMPERATURE: 98 F | BODY MASS INDEX: 27.77 KG/M2

## 2018-02-14 DIAGNOSIS — M62.838 MUSCLE SPASM: ICD-10-CM

## 2018-02-14 DIAGNOSIS — E78.2 MIXED HYPERLIPIDEMIA: ICD-10-CM

## 2018-02-14 DIAGNOSIS — Z11.59 NEED FOR HEPATITIS C SCREENING TEST: ICD-10-CM

## 2018-02-14 DIAGNOSIS — Z00.00 ANNUAL PHYSICAL EXAM: Primary | ICD-10-CM

## 2018-02-14 DIAGNOSIS — R73.03 PRE-DIABETES: ICD-10-CM

## 2018-02-14 DIAGNOSIS — Z12.31 VISIT FOR SCREENING MAMMOGRAM: ICD-10-CM

## 2018-02-14 DIAGNOSIS — R13.10 DYSPHAGIA, UNSPECIFIED TYPE: ICD-10-CM

## 2018-02-14 PROCEDURE — 77063 BREAST TOMOSYNTHESIS BI: CPT | Mod: 26,,, | Performed by: RADIOLOGY

## 2018-02-14 PROCEDURE — 99999 PR PBB SHADOW E&M-EST. PATIENT-LVL IV: CPT | Mod: PBBFAC,,, | Performed by: INTERNAL MEDICINE

## 2018-02-14 PROCEDURE — 99396 PREV VISIT EST AGE 40-64: CPT | Mod: S$GLB,,, | Performed by: INTERNAL MEDICINE

## 2018-02-14 PROCEDURE — 77067 SCR MAMMO BI INCL CAD: CPT | Mod: TC,PO

## 2018-02-14 PROCEDURE — 77067 SCR MAMMO BI INCL CAD: CPT | Mod: 26,,, | Performed by: RADIOLOGY

## 2018-02-14 RX ORDER — PRAVASTATIN SODIUM 20 MG/1
20 TABLET ORAL DAILY
Qty: 90 TABLET | Refills: 3 | Status: SHIPPED | OUTPATIENT
Start: 2018-02-14 | End: 2019-02-01 | Stop reason: SDUPTHER

## 2018-02-14 NOTE — PROGRESS NOTES
"Subjective:       Patient ID: Nicole Perez is a 57 y.o. female.    Chief Complaint: Establish Care and Annual Exam    HPI    57 y.o. female here for annual exam.     Health Maintenance:   Lipid disorders/ASCVD risk: due    DM: A1c due  Hepatitis C ([id number]): due   Sexual/ STD Screening: has HPV, no other concerns  Eye exam: 1 year ago  Breast Cancer (40-74y): Mammogram 1 year ago - due   Cervical Cancer (21-65y):  S/p HAROON, has upcoming appt w/ gyn - she wants yearly exams  Colonoscopy: has order in by GI, she will schedule when she finds a friend who can drive her      Vaccines:   Influenza (yearly) declines   Tetanus (every 10 yrs - 1st tdap) declines        Medical Problems:  1. Muscle spasm: imaging negative for causes, sees neurologist - Dr. Braden Skinner;  On chlorzoxazone  2. HLD: on pravastatin 20mg daily. No worsening of muscle spams after starting.  3. Dysphagia: on omeprazole. Saw GI, no plans to do EGD. Normal MBSS. She wants to see ENT.  4. Pre-diabetes: she was unaware.  5. Fatty liver      Past Medical History:   Diagnosis Date    Abdominal bloating 2017    Abnormal Pap smear of cervix     Elevated liver function tests 2017    Foot swelling 2017    Bilateral    GERD (gastroesophageal reflux disease)     History of colon polyps     History of Helicobacter pylori infection     History of hematuria 2017    Hypercholesteremia 2017    Lactose intolerance 2017    Prediabetes 2017    Spasm of muscle     Diffuse muscle spasms "for many years." On meds for this.     Past Surgical History:   Procedure Laterality Date    breast reduction Bilateral      SECTION      CHOLECYSTECTOMY      HYSTERECTOMY      TOTAL ABDOMINAL HYSTERECTOMY W/ BILATERAL SALPINGOOPHORECTOMY      tummy tuck Bilateral      Family History   Problem Relation Age of Onset    Heart disease Father     Hypertension Father     Heart disease Mother     Hypertension " Mother      Social History     Social History    Marital status:      Spouse name: N/A    Number of children: N/A    Years of education: N/A     Occupational History    Not on file.     Social History Main Topics    Smoking status: Former Smoker     Packs/day: 0.50     Years: 25.00     Quit date: 2/14/2008    Smokeless tobacco: Never Used    Alcohol use No    Drug use: No    Sexual activity: Yes     Partners: Male     Birth control/ protection: See Surgical Hx      Comment:      Other Topics Concern    Not on file     Social History Narrative    Works in [employer], works from home     Review of patient's allergies indicates:  No Known Allergies    Current Outpatient Prescriptions:     chlorzoxazone (PARAFON FORTE) 500 mg Tab, , Disp: , Rfl:     omeprazole (PRILOSEC) 40 MG capsule, take 1 capsule by mouth once daily, Disp: 30 capsule, Rfl: 11    pravastatin (PRAVACHOL) 20 MG tablet, Take 1 tablet (20 mg total) by mouth once daily., Disp: 90 tablet, Rfl: 3    Review of Systems   Constitutional: Negative for activity change, fever and unexpected weight change.   HENT: Positive for trouble swallowing. Negative for dental problem and sinus pain.    Eyes: Negative for discharge and redness.   Respiratory: Negative for cough and shortness of breath.    Cardiovascular: Negative for chest pain and leg swelling.   Gastrointestinal: Negative for abdominal pain, blood in stool, constipation and diarrhea.   Genitourinary: Negative for difficulty urinating, dysuria and frequency.   Musculoskeletal: Positive for myalgias. Negative for gait problem and joint swelling.   Skin: Negative for pallor, rash and wound.   Neurological: Negative for weakness and headaches.       Objective:        Vitals:    02/14/18 0909   BP: 116/73   BP Location: Left arm   Patient Position: Sitting   BP Method: Medium (Manual)   Pulse: 71   Resp: 16   Temp: 98.4 °F (36.9 °C)   TempSrc: Oral   Weight: 71.1  "kg (156 lb 12 oz)   Height: 5' 3" (1.6 m)       Body mass index is 27.77 kg/m².    Physical Exam   Constitutional: She is oriented to person, place, and time. She appears well-developed. No distress.   HENT:   Head: Normocephalic and atraumatic.   Right Ear: Tympanic membrane normal.   Left Ear: Tympanic membrane normal.   Nose: Nose normal.   Mouth/Throat: Oropharynx is clear and moist.   Eyes: Conjunctivae and EOM are normal. Pupils are equal, round, and reactive to light.   Neck: Normal range of motion. Neck supple. No tracheal deviation present. No thyromegaly present.   Cardiovascular: Normal rate, regular rhythm, normal heart sounds and intact distal pulses.    Pulmonary/Chest: Effort normal and breath sounds normal.   Abdominal: Soft. Bowel sounds are normal. She exhibits no distension and no mass. There is no tenderness.   Musculoskeletal: Normal range of motion. She exhibits no edema.   Lymphadenopathy:     She has no cervical adenopathy.   Neurological: She is alert and oriented to person, place, and time. No sensory deficit.   Skin: Skin is warm and dry. She is not diaphoretic. No cyanosis. Nails show no clubbing.   Psychiatric: She has a normal mood and affect. Her behavior is normal. Judgment normal.       Assessment:     1. Annual physical exam    2. Pre-diabetes    3. Need for hepatitis C screening test    4. Mixed hyperlipidemia    5. Muscle spasm    6. Visit for screening mammogram    7. Dysphagia, unspecified type           Plan:         1. Annual physical exam  - declines flu and tetanus vaccine  - CBC auto differential; Future  - Comprehensive metabolic panel; Future  - Urinalysis; Future  - Hemoglobin A1c; Future  - TSH; Future  - Lipid panel; Future  - Vitamin D; Future  - Hepatitis C antibody; Future    2. Pre-diabetes  - Hemoglobin A1c; Future    3. Need for hepatitis C screening test  - Hepatitis C antibody; Future    4. Mixed hyperlipidemia  - Lipid panel; Future  - pravastatin (PRAVACHOL) " 20 MG tablet; Take 1 tablet (20 mg total) by mouth once daily.  Dispense: 90 tablet; Refill: 3    5. Muscle spasm  - not exactly c/w restless leg syndrome, but will check iron panel to cover all bases as symptoms still present despite medications and previous w/u  - Comprehensive metabolic panel; Future  - Vitamin D; Future  - Iron and TIBC; Future  - Ferritin; Future  - Magnesium; Future     6. Visit for screening mammogram  - Mammo Digital Screening Bilat with CAD; Future    7. Dysphagia, unspecified type  - Ambulatory Referral to ENT    Labs to be completed at [facility], pt wishes to schedule f/u appt to discuss results rather than have phone call.

## 2018-02-15 ENCOUNTER — PATIENT MESSAGE (OUTPATIENT)
Dept: INTERNAL MEDICINE | Facility: CLINIC | Age: 58
End: 2018-02-15

## 2018-02-15 ENCOUNTER — HOSPITAL ENCOUNTER (OUTPATIENT)
Dept: RADIOLOGY | Facility: HOSPITAL | Age: 58
Discharge: HOME | End: 2018-02-15
Attending: INTERNAL MEDICINE
Payer: COMMERCIAL

## 2018-02-15 ENCOUNTER — TELEPHONE (OUTPATIENT)
Dept: RADIOLOGY | Facility: HOSPITAL | Age: 58
End: 2018-02-15

## 2018-02-15 DIAGNOSIS — R92.8 ABNORMAL MAMMOGRAM: ICD-10-CM

## 2018-02-15 PROCEDURE — 77061 BREAST TOMOSYNTHESIS UNI: CPT | Mod: 26,LT,, | Performed by: RADIOLOGY

## 2018-02-15 PROCEDURE — 77065 DX MAMMO INCL CAD UNI: CPT | Mod: 26,LT,, | Performed by: RADIOLOGY

## 2018-02-15 PROCEDURE — 76642 ULTRASOUND BREAST LIMITED: CPT | Mod: 26,LT,, | Performed by: RADIOLOGY

## 2018-02-15 PROCEDURE — 77061 BREAST TOMOSYNTHESIS UNI: CPT | Mod: TC,PO,LT

## 2018-02-15 PROCEDURE — 77065 DX MAMMO INCL CAD UNI: CPT | Mod: TC,PO,LT

## 2018-02-15 NOTE — TELEPHONE ENCOUNTER
Spoke with patient and explained mammogram findings.Patient expressed understanding of results. Patient is scheduled for a abnormal mammogram follow up appointment at [facility] on 2/15/18

## 2018-10-22 ENCOUNTER — OFFICE VISIT (OUTPATIENT)
Dept: CARDIOLOGY | Facility: CLINIC | Age: 58
End: 2018-10-22
Payer: COMMERCIAL

## 2018-10-22 VITALS
HEART RATE: 100 BPM | DIASTOLIC BLOOD PRESSURE: 72 MMHG | HEIGHT: 63 IN | SYSTOLIC BLOOD PRESSURE: 118 MMHG | WEIGHT: 158 LBS | BODY MASS INDEX: 28 KG/M2 | OXYGEN SATURATION: 100 %

## 2018-10-22 DIAGNOSIS — I83.893 VARICOSE VEINS OF BOTH LEGS WITH EDEMA: ICD-10-CM

## 2018-10-22 DIAGNOSIS — M79.606 PAIN OF LOWER EXTREMITY, UNSPECIFIED LATERALITY: ICD-10-CM

## 2018-10-22 DIAGNOSIS — R60.9 EDEMA, UNSPECIFIED TYPE: Primary | ICD-10-CM

## 2018-10-22 DIAGNOSIS — Z87.891 HISTORY OF SMOKING: ICD-10-CM

## 2018-10-22 DIAGNOSIS — I87.2 VENOUS INSUFFICIENCY OF BOTH LOWER EXTREMITIES: ICD-10-CM

## 2018-10-22 DIAGNOSIS — E78.2 MIXED HYPERLIPIDEMIA: ICD-10-CM

## 2018-10-22 PROCEDURE — 99215 OFFICE O/P EST HI 40 MIN: CPT | Mod: S$GLB,,, | Performed by: INTERNAL MEDICINE

## 2018-10-22 PROCEDURE — 99999 PR PBB SHADOW E&M-EST. PATIENT-LVL III: CPT | Mod: PBBFAC,,, | Performed by: INTERNAL MEDICINE

## 2018-10-22 PROCEDURE — 93005 ELECTROCARDIOGRAM TRACING: CPT | Mod: S$GLB,,, | Performed by: INTERNAL MEDICINE

## 2018-10-22 PROCEDURE — 93010 ELECTROCARDIOGRAM REPORT: CPT | Mod: S$GLB,,, | Performed by: INTERNAL MEDICINE

## 2018-10-22 PROCEDURE — 3008F BODY MASS INDEX DOCD: CPT | Mod: CPTII,S$GLB,, | Performed by: INTERNAL MEDICINE

## 2018-10-31 NOTE — PROGRESS NOTES
Subjective:   Patient ID:  Nicole Perez is a 58 y.o. female who presents for evaluation and treatment of Heart Murmur; Edema; and Chronic Venous Insufficiency      HPI:       She is here for evaluation of venous insufficiency complicated with varicose veins, edema, and tenderness. No ulcers.       US 2017       No DVT      R CFV  + > 500 msec reflux    R GSV 4.1 mm + > 500 msec reflux   R LSV 6.1 mm -reflux     L POP + > 500 msec reflux   L GSV 3.6 mm + > 500 msec reflux   L LSV 5.5 mm -reflux                Patient Active Problem List    Diagnosis Date Noted    Venous insufficiency of both lower extremities 10/22/2018         US        No DVT      R CFV  + > 500 msec reflux    R GSV 4.1 mm + > 500 msec reflux   R LSV 6.1 mm -reflux     L POP + > 500 msec reflux   L GSV 3.6 mm + > 500 msec reflux   L LSV 5.5 mm -reflux              Mixed hyperlipidemia 2018    Pre-diabetes 2018    Muscle spasm 2018    Varicose veins of both legs with edema 2017    Pain of lower extremity 2017    History of smoking 2017           Right Arm BP - Sittin/76  Left Arm BP - Sittin/72        LABS      Lipid panel  Lab Results   Component Value Date    CHOL 325 (H) 2017     Lab Results   Component Value Date    HDL 42 (L) 2017     Lab Results   Component Value Date    LDLCALC 223 (H) 2017     Lab Results   Component Value Date    TRIG 301 (H) 2017     Lab Results   Component Value Date    CHOLHDL 7.7 (H) 2017            Review of Systems   Constitution: Negative for diaphoresis, weakness, night sweats, weight gain and weight loss.   HENT: Negative for congestion.    Eyes: Negative for blurred vision, discharge and double vision.   Cardiovascular: Negative for chest pain, claudication, cyanosis, dyspnea on exertion, irregular heartbeat, leg swelling, near-syncope, orthopnea, palpitations, paroxysmal nocturnal dyspnea and syncope.   Respiratory:  Negative for cough, shortness of breath and wheezing.    Endocrine: Negative for cold intolerance, heat intolerance and polyphagia.   Hematologic/Lymphatic: Negative for adenopathy and bleeding problem. Does not bruise/bleed easily.   Skin: Negative for dry skin and nail changes.   Musculoskeletal: Negative for arthritis, back pain, falls, joint pain, myalgias and neck pain.   Gastrointestinal: Negative for bloating, abdominal pain, change in bowel habit and constipation.   Genitourinary: Negative for bladder incontinence, dysuria, flank pain, genital sores and missed menses.   Neurological: Negative for aphonia, brief paralysis, difficulty with concentration and dizziness.   Psychiatric/Behavioral: Negative for altered mental status and memory loss. The patient does not have insomnia.    Allergic/Immunologic: Negative for environmental allergies.       Objective:   Physical Exam   Constitutional: She is oriented to person, place, and time. She appears well-developed and well-nourished. She is not intubated.   HENT:   Head: Normocephalic and atraumatic.   Right Ear: External ear normal.   Left Ear: External ear normal.   Mouth/Throat: Oropharynx is clear and moist.   Eyes: Conjunctivae and EOM are normal. Pupils are equal, round, and reactive to light. Right eye exhibits no discharge. Left eye exhibits no discharge. No scleral icterus.   Neck: Normal range of motion. Neck supple. Normal carotid pulses, no hepatojugular reflux and no JVD present. Carotid bruit is not present. No tracheal deviation present. No thyromegaly present.   Cardiovascular: Normal rate, regular rhythm, S1 normal and S2 normal.  No extrasystoles are present. PMI is not displaced. Exam reveals no gallop, no S3, no distant heart sounds, no friction rub and no midsystolic click.   No murmur heard.  Pulses:       Carotid pulses are 2+ on the right side, and 2+ on the left side.       Radial pulses are 2+ on the right side, and 2+ on the left side.         Femoral pulses are 2+ on the right side, and 2+ on the left side.       Popliteal pulses are 2+ on the right side, and 2+ on the left side.        Dorsalis pedis pulses are 2+ on the right side, and 2+ on the left side.        Posterior tibial pulses are 2+ on the right side, and 2+ on the left side.   Pulmonary/Chest: Effort normal and breath sounds normal. No accessory muscle usage or stridor. No apnea, no tachypnea and no bradypnea. She is not intubated. No respiratory distress. She has no decreased breath sounds. She has no wheezes. She has no rales. She exhibits no tenderness and no bony tenderness.   Abdominal: She exhibits no distension, no pulsatile liver, no abdominal bruit, no ascites, no pulsatile midline mass and no mass. There is no tenderness. There is no rebound and no guarding.   Musculoskeletal: Normal range of motion. She exhibits no edema or tenderness.   Lymphadenopathy:     She has no cervical adenopathy.   Neurological: She is alert and oriented to person, place, and time. She has normal reflexes. No cranial nerve deficit. Coordination normal.   Skin: Skin is warm. No rash noted. No erythema. No pallor.   Psychiatric: She has a normal mood and affect. Her behavior is normal. Judgment and thought content normal.       Assessment:     1. Edema, unspecified type    2. Venous insufficiency of both lower extremities    3. Pain of lower extremity, unspecified laterality    4. History of smoking    5. Mixed hyperlipidemia    6. Varicose veins of both legs with edema        Plan:         Exercise  Weight loss  Compression stockings  Dietary changes      Venous insufficiency ultrasound  Consider EVLT for superficial disease  If there is no improvement she will proceed with venogram    US in 2/2017 revealed bilateral GSV + deep venous reflux          Continue with current medical plan and lifestyle changes.  Return sooner for concerns or questions. If symptoms persist go to the ED  I have reviewed  all pertinent data on this patient       I have reviewed the patient's medical history in detail and updated the computerized patient record.    Orders Placed This Encounter   Procedures    COMPRESSION STOCKINGS     Knee high     Order Specific Question:   Pressure amount:     Answer:   20-30 mmHg    US Lower Extremity Veins Bilateral Insuf     Standing Status:   Future     Standing Expiration Date:   10/22/2019     Order Specific Question:   May the Radiologist modify the order per protocol to meet the clinical needs of the patient?     Answer:   Yes    IN OFFICE EKG 12-LEAD (to Muse)     Order Specific Question:   Diagnosis     Answer:   Edema [782.3.ICD-9-CM]    2D echo with color flow doppler     Standing Status:   Future     Standing Expiration Date:   10/22/2019       Follow up as scheduled. Return sooner for concerns or questions            She expressed verbal understanding and agreed with the plan    Greater than 50% of the visit of 45 minutes was spent counseling, educating, and coordinating the care of the patient.             Medication List           Accurate as of 10/22/18 11:59 PM. If you have any questions, ask your nurse or doctor.               CONTINUE taking these medications    chlorzoxazone 500 mg Tab  Commonly known as:  PARAFON FORTE     omeprazole 40 MG capsule  Commonly known as:  PRILOSEC  take 1 capsule by mouth once daily     pravastatin 20 MG tablet  Commonly known as:  PRAVACHOL  Take 1 tablet (20 mg total) by mouth once daily.

## 2018-11-01 RX ORDER — OMEPRAZOLE 40 MG/1
CAPSULE, DELAYED RELEASE ORAL
Qty: 90 CAPSULE | Refills: 2 | Status: SHIPPED | OUTPATIENT
Start: 2018-11-01 | End: 2019-08-14 | Stop reason: SDUPTHER

## 2019-02-01 DIAGNOSIS — E78.2 MIXED HYPERLIPIDEMIA: ICD-10-CM

## 2019-02-01 RX ORDER — PRAVASTATIN SODIUM 20 MG/1
20 TABLET ORAL DAILY
Qty: 90 TABLET | Refills: 0 | Status: SHIPPED | OUTPATIENT
Start: 2019-02-01 | End: 2019-05-31 | Stop reason: SDUPTHER

## 2019-02-04 ENCOUNTER — HOSPITAL ENCOUNTER (OUTPATIENT)
Dept: RADIOLOGY | Facility: HOSPITAL | Age: 59
Discharge: HOME | End: 2019-02-04
Attending: INTERNAL MEDICINE
Payer: COMMERCIAL

## 2019-02-04 ENCOUNTER — HOSPITAL ENCOUNTER (OUTPATIENT)
Dept: CARDIOLOGY | Facility: HOSPITAL | Age: 59
Discharge: HOME | End: 2019-02-04
Attending: INTERNAL MEDICINE
Payer: COMMERCIAL

## 2019-02-04 DIAGNOSIS — I87.2 VENOUS INSUFFICIENCY OF BOTH LOWER EXTREMITIES: ICD-10-CM

## 2019-02-04 DIAGNOSIS — R60.9 EDEMA, UNSPECIFIED TYPE: ICD-10-CM

## 2019-02-04 LAB
AORTIC ROOT ANNULUS: 2.25 CM
AORTIC VALVE CUSP SEPERATION: 1.5 CM
AV INDEX (PROSTH): 0.88
AV MEAN GRADIENT: 5.37 MMHG
AV PEAK GRADIENT: 10.11 MMHG
AV VALVE AREA: 2.45 CM2
AV VELOCITY RATIO: 0.85
CV ECHO LV RWT: 0.52 CM
DOP CALC AO PEAK VEL: 1.59 M/S
DOP CALC AO VTI: 32.23 CM
DOP CALC LVOT AREA: 2.77 CM2
DOP CALC LVOT DIAMETER: 1.88 CM
DOP CALC LVOT PEAK VEL: 1.35 M/S
DOP CALC LVOT STROKE VOLUME: 78.88 CM3
DOP CALCLVOT PEAK VEL VTI: 28.43 CM
E WAVE DECELERATION TIME: 167.24 MSEC
E/A RATIO: 1.15
ECHO LV POSTERIOR WALL: 0.89 CM (ref 0.6–1.1)
FRACTIONAL SHORTENING: 28 % (ref 28–44)
INTERVENTRICULAR SEPTUM: 1.24 CM (ref 0.6–1.1)
LEFT ATRIUM SIZE: 2.86 CM
LEFT INTERNAL DIMENSION IN SYSTOLE: 2.47 CM (ref 2.1–4)
LEFT VENTRICLE DIASTOLIC VOLUME: 48.88 ML
LEFT VENTRICLE SYSTOLIC VOLUME: 21.68 ML
LEFT VENTRICULAR INTERNAL DIMENSION IN DIASTOLE: 3.44 CM (ref 3.5–6)
LEFT VENTRICULAR MASS: 110.51 G
MV PEAK A VEL: 0.78 M/S
MV PEAK E VEL: 0.9 M/S
PISA TR MAX VEL: 2.43 M/S
PULM VEIN S/D RATIO: 1.31
PV PEAK D VEL: 0.39 M/S
PV PEAK S VEL: 0.51 M/S
PV PEAK VELOCITY: 1.01 CM/S
RA PRESSURE: 3 MMHG
RIGHT VENTRICULAR END-DIASTOLIC DIMENSION: 2.46 CM
TR MAX PG: 23.62 MMHG
TV REST PULMONARY ARTERY PRESSURE: 27 MMHG

## 2019-02-04 PROCEDURE — 93970 EXTREMITY STUDY: CPT | Mod: 26,,, | Performed by: RADIOLOGY

## 2019-02-04 PROCEDURE — 93306 TTE W/DOPPLER COMPLETE: CPT | Mod: 26,,, | Performed by: INTERNAL MEDICINE

## 2019-02-04 PROCEDURE — 93306 TRANSTHORACIC ECHO (TTE) COMPLETE: ICD-10-PCS | Mod: 26,,, | Performed by: INTERNAL MEDICINE

## 2019-02-04 PROCEDURE — 93970 EXTREMITY STUDY: CPT | Mod: TC

## 2019-02-04 PROCEDURE — 93970 US LOWER EXTREMITY VEINS BILATERAL INSUFFICIENCY: ICD-10-PCS | Mod: 26,,, | Performed by: RADIOLOGY

## 2019-02-04 PROCEDURE — 93306 TTE W/DOPPLER COMPLETE: CPT

## 2019-02-25 ENCOUNTER — OFFICE VISIT (OUTPATIENT)
Dept: CARDIOLOGY | Facility: CLINIC | Age: 59
End: 2019-02-25
Payer: COMMERCIAL

## 2019-02-25 VITALS
WEIGHT: 161.25 LBS | SYSTOLIC BLOOD PRESSURE: 116 MMHG | HEART RATE: 77 BPM | DIASTOLIC BLOOD PRESSURE: 78 MMHG | HEIGHT: 63 IN | BODY MASS INDEX: 28.57 KG/M2

## 2019-02-25 DIAGNOSIS — I87.2 VENOUS INSUFFICIENCY OF BOTH LOWER EXTREMITIES: Primary | ICD-10-CM

## 2019-02-25 DIAGNOSIS — I83.893 VARICOSE VEINS OF BOTH LEGS WITH EDEMA: ICD-10-CM

## 2019-02-25 DIAGNOSIS — E78.2 MIXED HYPERLIPIDEMIA: ICD-10-CM

## 2019-02-25 PROCEDURE — 99215 PR OFFICE/OUTPT VISIT, EST, LEVL V, 40-54 MIN: ICD-10-PCS | Mod: S$GLB,,, | Performed by: INTERNAL MEDICINE

## 2019-02-25 PROCEDURE — 99999 PR PBB SHADOW E&M-EST. PATIENT-LVL III: CPT | Mod: PBBFAC,,, | Performed by: INTERNAL MEDICINE

## 2019-02-25 PROCEDURE — 3008F PR BODY MASS INDEX (BMI) DOCUMENTED: ICD-10-PCS | Mod: CPTII,S$GLB,, | Performed by: INTERNAL MEDICINE

## 2019-02-25 PROCEDURE — 99999 PR PBB SHADOW E&M-EST. PATIENT-LVL III: ICD-10-PCS | Mod: PBBFAC,,, | Performed by: INTERNAL MEDICINE

## 2019-02-25 PROCEDURE — 3008F BODY MASS INDEX DOCD: CPT | Mod: CPTII,S$GLB,, | Performed by: INTERNAL MEDICINE

## 2019-02-25 PROCEDURE — 99215 OFFICE O/P EST HI 40 MIN: CPT | Mod: S$GLB,,, | Performed by: INTERNAL MEDICINE

## 2019-02-25 NOTE — PATIENT INSTRUCTIONS
Understanding Chronic Venous Insufficiency  Problems with the veins in the legs may lead to chronic venous insufficiency (CVI). CVI means that there is a long-term problem with the veins not being able to pump blood back to your heart. When this happens, blood stays in the legs and causes swelling and aching.   Two problems that may lead to chronic venous insufficiency are:  · Damaged valves. Valves keep blood flowing from the legs through the blood vessels and back to the heart. When the valves are damaged, blood does not flow as well.   · Deep vein thrombosis (DVT). Blood clots may form in the deep veins of the legs. This may cause pain, redness, and swelling in the legs. It may also block the flow of blood back to the heart. Seek immediate medical care if you have these symptoms.  · A blood clot in the leg can also break off and travel to the lungs. This is called pulmonary embolism (PE). In the lungs, the clot can cut off the flow of blood. This may cause chest pain, trouble breathing, sweating, a fast heartbeat, coughing (may cough up blood), and fainting. It is a medical emergency and may cause death. Call 911 if you have these symptoms.  · Healthcare providers call the two conditions, DVT and PE, venous thromboembolism (VTE).  CVI cant be cured, but you can control leg swelling to reduce the likelihood of ulcers (sores).  Recognizing the symptoms  Be aware of the following:  · If you stand or sit with your feet down for long periods, your legs may ache or feel heavy.  · Swollen ankles are possibly the most common symptom of CVI.  · As swelling increases, the skin over your ankles may show red spots or a brownish tinge. The skin may feel leathery or scaly, and may start to itch.  · If swelling is not controlled, an ulcer (open wound) may form.  What you can do  Reduce your risk of developing ulcers by doing the following:  · Increase blood flow back to your heart by elevating your legs, exercising daily,  and wearing elastic stockings.  · Boost blood flow in your legs by losing excess weight.  · If you must stand or sit in one place for a period of time, keep your blood moving by wiggling your toes, shifting your body position, and rising up on the balls of your feet.    Date Last Reviewed: 5/1/2016  © 7160-9770 [employer]. [address] [place] [address], [place] [address]. All rights reserved. This information is not intended as a substitute for professional medical care. Always follow your healthcare professional's instructions.

## 2019-02-25 NOTE — PROGRESS NOTES
Subjective:   Patient ID:  Nicole Perez is a 58 y.o. female who presents for evaluation and treatment of Edema; Chronic Venous Insufficiency; and Hyperlipidemia      HPI:       She is here for evaluation of venous insufficiency complicated with varicose veins, edema, and tenderness. No ulcers. She is not wearing compression stockings per previous recommendations. She also has HLP with less than 7.5 % 10 yr CV risk. Currently on statin therapy initiated and monitored by pcp.       US 2017       No DVT      R CFV  + > 500 msec reflux    R GSV 4.1 mm + > 500 msec reflux   R LSV 6.1 mm -reflux     L POP + > 500 msec reflux   L GSV 3.6 mm + > 500 msec reflux   L LSV 5.5 mm -reflux      US 2019     R GSV 4.1 mm + reflux 2392 msec   R LSV 3.4 mm + reflux 2240 msec     L GSV 4.2 mm - reflux   L LSV 3.2 mm + reflux 2812 msec     No DVT       Echo 2019     Normal EF   Normal diastolic parameters   No valvular disease   Normal PA pressure                Patient Active Problem List    Diagnosis Date Noted    Venous insufficiency of both lower extremities 10/22/2018         US 2017       No DVT      R CFV  + > 500 msec reflux    R GSV 4.1 mm + > 500 msec reflux   R LSV 6.1 mm -reflux     L POP + > 500 msec reflux   L GSV 3.6 mm + > 500 msec reflux   L LSV 5.5 mm -reflux              Mixed hyperlipidemia 2018         As of 10/22/2018: 10 yr CV risk is 5.4%        Pre-diabetes 2018    Muscle spasm 2018    Varicose veins of both legs with edema 2017    Pain of lower extremity 2017    History of smoking 2017           Right Arm BP - Sittin/70  Left Arm BP - Sittin/70        LABS      Lipid panel  Lab Results   Component Value Date    CHOL 325 (H) 2017     Lab Results   Component Value Date    HDL 42 (L) 2017     Lab Results   Component Value Date    LDLCALC 223 (H) 2017     Lab Results   Component Value Date    TRIG 301 (H) 2017     Lab Results    Component Value Date    CHOLHDL 7.7 (H) 02/18/2017            Review of Systems   Constitution: Negative for diaphoresis, weakness, night sweats, weight gain and weight loss.   HENT: Negative for congestion.    Eyes: Negative for blurred vision, discharge and double vision.   Cardiovascular: Negative for chest pain, claudication, cyanosis, dyspnea on exertion, irregular heartbeat, leg swelling, near-syncope, orthopnea, palpitations, paroxysmal nocturnal dyspnea and syncope.   Respiratory: Negative for cough, shortness of breath and wheezing.    Endocrine: Negative for cold intolerance, heat intolerance and polyphagia.   Hematologic/Lymphatic: Negative for adenopathy and bleeding problem. Does not bruise/bleed easily.   Skin: Negative for dry skin and nail changes.   Musculoskeletal: Negative for arthritis, back pain, falls, joint pain, myalgias and neck pain.   Gastrointestinal: Negative for bloating, abdominal pain, change in bowel habit and constipation.   Genitourinary: Negative for bladder incontinence, dysuria, flank pain, genital sores and missed menses.   Neurological: Negative for aphonia, brief paralysis, difficulty with concentration and dizziness.   Psychiatric/Behavioral: Negative for altered mental status and memory loss. The patient does not have insomnia.    Allergic/Immunologic: Negative for environmental allergies.       Objective:   Physical Exam   Constitutional: She is oriented to person, place, and time. She appears well-developed and well-nourished. She is not intubated.   HENT:   Head: Normocephalic and atraumatic.   Right Ear: External ear normal.   Left Ear: External ear normal.   Mouth/Throat: Oropharynx is clear and moist.   Eyes: Conjunctivae and EOM are normal. Pupils are equal, round, and reactive to light. Right eye exhibits no discharge. Left eye exhibits no discharge. No scleral icterus.   Neck: Normal range of motion. Neck supple. Normal carotid pulses, no hepatojugular reflux and  no JVD present. Carotid bruit is not present. No tracheal deviation present. No thyromegaly present.   Cardiovascular: Normal rate, regular rhythm, S1 normal and S2 normal.  No extrasystoles are present. PMI is not displaced. Exam reveals no gallop, no S3, no distant heart sounds, no friction rub and no midsystolic click.   No murmur heard.  Pulses:       Carotid pulses are 2+ on the right side, and 2+ on the left side.       Radial pulses are 2+ on the right side, and 2+ on the left side.        Femoral pulses are 2+ on the right side, and 2+ on the left side.       Popliteal pulses are 2+ on the right side, and 2+ on the left side.        Dorsalis pedis pulses are 2+ on the right side, and 2+ on the left side.        Posterior tibial pulses are 2+ on the right side, and 2+ on the left side.   Pulmonary/Chest: Effort normal and breath sounds normal. No accessory muscle usage or stridor. No apnea, no tachypnea and no bradypnea. She is not intubated. No respiratory distress. She has no decreased breath sounds. She has no wheezes. She has no rales. She exhibits no tenderness and no bony tenderness.   Abdominal: She exhibits no distension, no pulsatile liver, no abdominal bruit, no ascites, no pulsatile midline mass and no mass. There is no tenderness. There is no rebound and no guarding.   Musculoskeletal: Normal range of motion. She exhibits no edema or tenderness.   Lymphadenopathy:     She has no cervical adenopathy.   Neurological: She is alert and oriented to person, place, and time. She has normal reflexes. No cranial nerve deficit. Coordination normal.   Skin: Skin is warm. No rash noted. No erythema. No pallor.   Psychiatric: She has a normal mood and affect. Her behavior is normal. Judgment and thought content normal.       Assessment:     1. Venous insufficiency of both lower extremities    2. Varicose veins of both legs with edema    3. Mixed hyperlipidemia        Plan:         Exercise  Weight  loss  Compression stockings 20-30 mmHg  Dietary changes      Venous insufficiency ultrasound  Consider EVLT for superficial disease  If there is no improvement she will proceed with venogram    US in 2/2017 revealed bilateral GSV + deep venous reflux          Continue with current medical plan and lifestyle changes.  Return sooner for concerns or questions. If symptoms persist go to the ED  I have reviewed all pertinent data on this patient       I have reviewed the patient's medical history in detail and updated the computerized patient record.    Orders Placed This Encounter   Procedures    COMPRESSION STOCKINGS     Knee high     Order Specific Question:   Pressure amount:     Answer:   20-30 mmHg    US Lower Extremity Veins Bilateral Insuf     Standing Status:   Future     Standing Expiration Date:   2/25/2020     Order Specific Question:   May the Radiologist modify the order per protocol to meet the clinical needs of the patient?     Answer:   Yes       Follow up as scheduled. Return sooner for concerns or questions            She expressed verbal understanding and agreed with the plan    Greater than 50% of the visit of 45 minutes was spent counseling, educating, and coordinating the care of the patient.          Patient's Medications   New Prescriptions    No medications on file   Previous Medications    CHLORZOXAZONE (PARAFON FORTE) 500 MG TAB        OMEPRAZOLE (PRILOSEC) 40 MG CAPSULE    TAKE ONE CAPSULE BY MOUTH EVERY DAY    PRAVASTATIN (PRAVACHOL) 20 MG TABLET    Take 1 tablet (20 mg total) by mouth once daily.   Modified Medications    No medications on file   Discontinued Medications    No medications on file

## 2019-03-07 ENCOUNTER — OFFICE VISIT (OUTPATIENT)
Dept: FAMILY MEDICINE | Facility: CLINIC | Age: 59
End: 2019-03-07
Attending: FAMILY MEDICINE
Payer: COMMERCIAL

## 2019-03-07 VITALS
WEIGHT: 162.06 LBS | DIASTOLIC BLOOD PRESSURE: 75 MMHG | OXYGEN SATURATION: 99 % | HEART RATE: 94 BPM | SYSTOLIC BLOOD PRESSURE: 106 MMHG | BODY MASS INDEX: 28.7 KG/M2

## 2019-03-07 DIAGNOSIS — E78.2 MIXED HYPERLIPIDEMIA: ICD-10-CM

## 2019-03-07 DIAGNOSIS — Z12.31 ENCOUNTER FOR SCREENING MAMMOGRAM FOR BREAST CANCER: ICD-10-CM

## 2019-03-07 DIAGNOSIS — Z00.00 ROUTINE GENERAL MEDICAL EXAMINATION AT A HEALTH CARE FACILITY: Primary | ICD-10-CM

## 2019-03-07 DIAGNOSIS — M62.838 MUSCLE SPASMS OF BOTH LOWER EXTREMITIES: ICD-10-CM

## 2019-03-07 DIAGNOSIS — I87.2 VENOUS INSUFFICIENCY OF BOTH LOWER EXTREMITIES: ICD-10-CM

## 2019-03-07 DIAGNOSIS — Z12.11 COLON CANCER SCREENING: ICD-10-CM

## 2019-03-07 DIAGNOSIS — Z11.59 ENCOUNTER FOR HEPATITIS C SCREENING TEST FOR LOW RISK PATIENT: ICD-10-CM

## 2019-03-07 DIAGNOSIS — R73.03 PRE-DIABETES: ICD-10-CM

## 2019-03-07 PROCEDURE — 99999 PR PBB SHADOW E&M-EST. PATIENT-LVL III: CPT | Mod: PBBFAC,,, | Performed by: FAMILY MEDICINE

## 2019-03-07 PROCEDURE — 99396 PR PREVENTIVE VISIT,EST,40-64: ICD-10-PCS | Mod: S$GLB,,, | Performed by: FAMILY MEDICINE

## 2019-03-07 PROCEDURE — 99396 PREV VISIT EST AGE 40-64: CPT | Mod: S$GLB,,, | Performed by: FAMILY MEDICINE

## 2019-03-07 PROCEDURE — 99999 PR PBB SHADOW E&M-EST. PATIENT-LVL III: ICD-10-PCS | Mod: PBBFAC,,, | Performed by: FAMILY MEDICINE

## 2019-03-08 ENCOUNTER — TELEPHONE (OUTPATIENT)
Dept: FAMILY MEDICINE | Facility: CLINIC | Age: 59
End: 2019-03-08

## 2019-03-09 NOTE — PROGRESS NOTES
Subjective:       Patient ID: Nicole Perez is a 58 y.o. female.    Chief Complaint: Establish Care (fitkit) and Annual Exam    58 yr old pleasant female with HLD, IGT, venous insufficiency, obesity and other co morbidities presents today as new patient and establishing [facility] and her annual wellness check an lab work. She has chronic muscle aches. She is originally from [place] and moved here 3 years ago.      HLd - controlled - on statin - compliant - no side effects       Venous insufficiency - stable - asymptomatic - follows cardiology    IGT - diet controlled - A1C                   5.8 (H)             02/18/2017              History as below - reviewed       HM  -labs UTD      Review of Systems   Constitutional: Negative.  Negative for activity change, diaphoresis and unexpected weight change.   HENT: Negative.  Negative for congestion, ear discharge, hearing loss, rhinorrhea, sore throat and voice change.    Eyes: Negative.  Negative for pain, discharge and visual disturbance.   Respiratory: Negative.  Negative for chest tightness, shortness of breath and wheezing.    Cardiovascular: Negative.  Negative for chest pain.   Gastrointestinal: Negative.  Negative for abdominal distention, anal bleeding, constipation and nausea.   Endocrine: Negative.  Negative for cold intolerance, polydipsia and polyuria.   Genitourinary: Negative.  Negative for decreased urine volume, difficulty urinating, dysuria, frequency, menstrual problem and vaginal pain.   Musculoskeletal: Negative.  Negative for arthralgias, gait problem and myalgias.   Skin: Negative.  Negative for color change, pallor and wound.   Allergic/Immunologic: Negative.  Negative for environmental allergies and immunocompromised state.   Neurological: Negative.  Negative for dizziness, tremors, seizures, speech difficulty and headaches.   Hematological: Negative.  Negative for adenopathy. Does not bruise/bleed easily.   Psychiatric/Behavioral: Negative.   Negative for agitation, confusion, decreased concentration, hallucinations, self-injury and suicidal ideas. The patient is not nervous/anxious.        PMH/PSH/FH/SH/MED/ALLERGY reviewed  Active Ambulatory Problems     Diagnosis Date Noted    Varicose veins of both legs with edema 2017    Pain of lower extremity 2017    History of smoking 2017    Mixed hyperlipidemia 2018    Pre-diabetes 2018    Muscle spasm 2018    Venous insufficiency of both lower extremities 10/22/2018     Resolved Ambulatory Problems     Diagnosis Date Noted    No Resolved Ambulatory Problems     Past Medical History:   Diagnosis Date    Abdominal bloating 2017    Abnormal Pap smear of cervix     Elevated liver function tests 2017    Foot swelling 2017    GERD (gastroesophageal reflux disease)     History of colon polyps     History of Helicobacter pylori infection     History of hematuria 2017    Hypercholesteremia 2017    Lactose intolerance 2017    Prediabetes 2017    Spasm of muscle      Past Surgical History:   Procedure Laterality Date    breast reduction Bilateral      SECTION      CHOLECYSTECTOMY      HYSTERECTOMY      TOTAL ABDOMINAL HYSTERECTOMY W/ BILATERAL SALPINGOOPHORECTOMY      TOTAL REDUCTION MAMMOPLASTY      tummy tuck Bilateral      Family History   Problem Relation Age of Onset    Heart disease Father     Hypertension Father     Heart disease Mother     Hypertension Mother      Social History     Socioeconomic History    Marital status:      Spouse name: Not on file    Number of children: Not on file    Years of education: Not on file    Highest education level: Not on file   Social Needs    Financial resource strain: Not on file    Food insecurity - worry: Not on file    Food insecurity - inability: Not on file    Transportation needs - medical: Not on file    Transportation needs - non-medical:  Not on file   Occupational History    Not on file   Tobacco Use    Smoking status: Former Smoker     Packs/day: 0.50     Years: 25.00     Pack years: 12.50     Last attempt to quit: 2008     Years since quittin.0    Smokeless tobacco: Never Used   Substance and Sexual Activity    Alcohol use: No    Drug use: No    Sexual activity: Yes     Partners: Male     Birth control/protection: See Surgical Hx     Comment:    Other Topics Concern    Not on file   Social History Narrative    Works in [employer] customer [employer], works from home     Review of patient's allergies indicates:  No Known Allergies  Current Outpatient Medications on File Prior to Visit   Medication Sig Dispense Refill    chlorzoxazone (PARAFON FORTE) 500 mg Tab       omeprazole (PRILOSEC) 40 MG capsule TAKE ONE CAPSULE BY MOUTH EVERY DAY 90 capsule 2    pravastatin (PRAVACHOL) 20 MG tablet Take 1 tablet (20 mg total) by mouth once daily. 90 tablet 0     No current facility-administered medications on file prior to visit.        Objective:       Vitals:    19 1500   BP: 106/75   Pulse: 94       Physical Exam   Constitutional: She is oriented to person, place, and time. She appears well-developed and well-nourished. No distress.   HENT:   Head: Normocephalic and atraumatic.   Right Ear: External ear normal.   Left Ear: External ear normal.   Nose: Nose normal.   Mouth/Throat: Oropharynx is clear and moist. No oropharyngeal exudate.   Eyes: Conjunctivae and EOM are normal. Pupils are equal, round, and reactive to light. Right eye exhibits no discharge. Left eye exhibits no discharge. No scleral icterus.   Neck: Normal range of motion. Neck supple. No JVD present. No tracheal deviation present. No thyromegaly present.   Cardiovascular: Normal rate, regular rhythm, normal heart sounds and intact distal pulses. Exam reveals no gallop and no friction rub.   No murmur heard.  Pulmonary/Chest: Effort normal and breath sounds  normal. No stridor. She has no wheezes. She has no rales. She exhibits no tenderness.   Abdominal: Soft. Bowel sounds are normal. She exhibits no distension and no mass. There is no tenderness. There is no rebound and no guarding. No hernia.   Musculoskeletal: Normal range of motion. She exhibits no edema or tenderness.   Lymphadenopathy:     She has no cervical adenopathy.   Neurological: She is alert and oriented to person, place, and time. She has normal reflexes. She displays normal reflexes. No cranial nerve deficit. She exhibits normal muscle tone. Coordination normal.   Skin: Skin is warm and dry. No rash noted. She is not diaphoretic. No erythema. No pallor.   Psychiatric: She has a normal mood and affect. Her behavior is normal. Judgment and thought content normal.       Assessment:       1. Routine general medical examination at a health care facility    2. Mixed hyperlipidemia    3. Venous insufficiency of both lower extremities    4. Pre-diabetes    5. Encounter for hepatitis C screening test for low risk patient    6. Colon cancer screening    7. Encounter for screening mammogram for breast cancer    8. Muscle spasms of both lower extremities        Plan:           Nicole was seen today for establish care and annual exam.    Diagnoses and all orders for this visit:    Routine general medical examination at a health care facility  -     CBC auto differential; Future  -     Comprehensive metabolic panel; Future  -     Lipid panel; Future  -     TSH; Future  -     CBC auto differential  -     Comprehensive metabolic panel  -     Lipid panel  -     TSH    Mixed hyperlipidemia  -     CBC auto differential; Future  -     Comprehensive metabolic panel; Future  -     Lipid panel; Future  -     TSH; Future  -     CBC auto differential  -     Comprehensive metabolic panel  -     Lipid panel  -     TSH    Venous insufficiency of both lower extremities    Pre-diabetes  -     Comprehensive metabolic panel; Future  -      Hemoglobin A1c; Future  -     Comprehensive metabolic panel  -     Hemoglobin A1c    Encounter for hepatitis C screening test for low risk patient  -     Hepatitis C antibody; Future  -     Hepatitis C antibody    Colon cancer screening  -     Case request GI: COLONOSCOPY    Encounter for screening mammogram for breast cancer  -     Mammo Digital Screening Bilat w/ Larry; Future    Muscle spasms of both lower extremities  -     CK; Future  -     C-reactive protein; Future      Spent adequate time in obtaining history and explaining differentials    40 minutes spent during this visit of which greater than 50% devoted to face-face counseling and coordination of care regarding diagnosis and management plan    Follow-up in about 6 months (around 9/7/2019), or if symptoms worsen or fail to improve.

## 2019-03-11 ENCOUNTER — TELEPHONE (OUTPATIENT)
Dept: GASTROENTEROLOGY | Facility: CLINIC | Age: 59
End: 2019-03-11

## 2019-03-11 NOTE — TELEPHONE ENCOUNTER
Colonoscopy Referral   Referring Physician: Dr. Hou  Date: 4/9/2019  Reason for Referral: History of Colon Polyps  Family History of: None  Colon polyp:  None  Relationship/Age of Onset: None  Colon cancer: None  Relationship/Age of Onset: None    Hemoccults Done:NO    Iron deficient: NO  On Blood Thinner: NO  Valvular heart disease/valve replacement:NO    Anemia Present: NO  On NSAID: NO  Lung disease: NO  Kidney disease: NO  Hx of polyps:YES  Hx of colon cancer: NO  Previous colon evalations: YES      When: Unknown  Where:   Pertinent symptoms:     Patient was scheduled for colonoscopy on 4/9/2019 with Dr. Staples at [facility]. Golytely instructions were reviewed with patient.

## 2019-03-11 NOTE — TELEPHONE ENCOUNTER
GOLYTELY/ COLYTE/ NULYTELY Instructions    You are scheduled for a colonoscopy with Dr. Staples on 4/9/2019 at [facility]  To ensure that your test is accurate and complete, you MUST follow these instructions listed below.  If you have any questions, please call our office at [phone].  Plan on being at the hospital for your procedure for 3-4 hours.    1.  Follow a CLEAR LIQUID DIET for the entire day before your scheduled colonoscopy.  This means no solid food the entire day starting when you wake.  You may have as much of the clear liquids as you want throughout the day.   CLEAR LIQUID DIET:   - Avoid Red, Orange, Purple, and/or Blue food coloring   - NO DAIRY   - You can have:  Coffee with sugar (no creamer), tea, water, soda, apple or white grape juice, chicken or beef broth/bouillon (no meat, noodles, or veggies), green/yellow popsicles, green/yellow Jell-O, lemonade.    2.  MIX GOLYTELY/COLYTE/NULYTELY (all names for same product) WITH ONE (1) GALLON OF WATER.  YOU MAY ADD A FLAVOR PACKET OR YELLOW/GREEN POWDER DRINK MIX TO THIS.  PUT IN REFRIGERATOR.  This is easier to drink if this solution is cold, so you can mix the solution one day ahead of time and place in the refrigerator prior to drinking.  You have to drink the solution within 24-36 hours of mixing it.  Do NOT put this solution over ice.  It IS ok to drink with a straw.    3. AT 5 PM THE DAY BEFORE YOUR COLONOSCOPY, DRINK ONE (1) 8 OUNCE GLASS OF MIXTURE EVERY 10 MINUTES UNTIL HALF OF THE GALLON IS CONSUMED.  Keep this mixture cold and in refrigerator as much as you can while drinking it.  Place the remaining half of mixture in the refrigerator when you finish the first half.    4.  The endoscopy department will call you 2 days before your colonoscopy to tell you the exact time to arrive, AND to tell you the exact time to drink the 2nd portion of your prep (which will be FIVE HOURS BEFORE YOUR ARRIVAL TIME).  At this time given to you, DRINK  ONE (1) 8 OUNCE GLASS OF MIXTURE EVERY 10 MINUTES UNTIL THE OTHER HALF IS CONSUMED. Keep the mixture cold while you are drinking it. Once this is complete, you may not have ANYTHING else by mouth!      5.  You must have someone with you to DRIVE YOU HOME since you will be receiving IV sedation for the colonoscopy.    6.  It is ok to take your heart, blood pressure, and seizure medications in the morning of your test with a SIP of water.  Hold other medications until after your procedure.  Do NOT have anything else to eat or drink the morning of your colonoscopy.  It is ok to brush your teeth.    7.  If you are on blood thinners THAT YOU HAVE BEEN INSTRUCTED TO HOLD BY YOUR DOCTOR FOR THIS PROCEDURE, then do NOT take this the morning of your colonoscopy.  Do NOT stop these medications on your own, they must be approved to be held by your doctor.  Your colonoscopy can NOT be done if you are on these medications.  Examples of blood thinners include: Coumadin, Aggrenox, Plavix, Pradaxa, Reapro, Pletal, Xarelto, Ticagrelor, Brilinta, Eliquis, and high dose aspirin (325 mg).  You do not have to stop baby aspirin 81 mg.    8.  IF YOU ARE DIABETIC:  NO INSULIN OR ORAL MEDICATIONS THE MORNING OF THE COLONOSCOPY.  TAKE ONLY HALF THE DOSE OF YOUR INSULIN THE DAY BEFORE THE COLONOSCOPY.  DO NOT TAKE ANY ORAL DIABETIC MEDICATIONS THE DAY BEFORE THE COLONOSCOPY.  IF YOU ARE AN INSULIN DEPENDENT DIABETIC WITH UNSTABLE BLOOD SUGARDS, NOTIFY YOUR PRIMARY CARE PHYSICIAN FOR INSTRUCTIONS.

## 2019-03-18 ENCOUNTER — HOSPITAL ENCOUNTER (OUTPATIENT)
Dept: RADIOLOGY | Facility: HOSPITAL | Age: 59
Discharge: HOME | End: 2019-03-18
Attending: FAMILY MEDICINE
Payer: COMMERCIAL

## 2019-03-18 DIAGNOSIS — Z12.31 ENCOUNTER FOR SCREENING MAMMOGRAM FOR BREAST CANCER: ICD-10-CM

## 2019-03-18 PROCEDURE — 77063 BREAST TOMOSYNTHESIS BI: CPT | Mod: 26,,, | Performed by: RADIOLOGY

## 2019-03-18 PROCEDURE — 77067 MAMMO DIGITAL SCREENING BILAT WITH TOMOSYNTHESIS_CAD: ICD-10-PCS | Mod: 26,,, | Performed by: RADIOLOGY

## 2019-03-18 PROCEDURE — 77067 SCR MAMMO BI INCL CAD: CPT | Mod: 26,,, | Performed by: RADIOLOGY

## 2019-03-18 PROCEDURE — 77067 SCR MAMMO BI INCL CAD: CPT | Mod: TC

## 2019-03-18 PROCEDURE — 77063 MAMMO DIGITAL SCREENING BILAT WITH TOMOSYNTHESIS_CAD: ICD-10-PCS | Mod: 26,,, | Performed by: RADIOLOGY

## 2019-04-05 ENCOUNTER — TELEPHONE (OUTPATIENT)
Dept: ENDOSCOPY | Facility: HOSPITAL | Age: 59
End: 2019-04-05

## 2019-04-05 NOTE — TELEPHONE ENCOUNTER
Spoke with patient about arrival time and prep instructions.     Arrival time of : 0830 am       Please check in on the 1st floor of [facility] (hospital, not medical building).       Clear liquid diet starting the morning BEFORE your procedure on:  Monday  1st part of bowel prep to be started at 5 pm the evening before your procedure.  2nd part of bowel prep to be started 5 hours before your arrival time at :  __0330___ and complete within an hour. NOTHING else to drink after completing you prep.        Please take your blood pressure, heart seizure medication the morning of the procedure with a small sip of water (3 hours before your arrival) . Hold all diabetic medication or any other medication the morning of the procedure. Bring any breathing inhalers to hospital with you if applicable. Please bring your pacemaker/defibrillator card if applicable. Leave all valuables at home.  You must have a ride available the day of the procedure.       If you have any questions, please contact us at [phone]. Our unit is open Monday-Friday, 0800 am - 4 pm.

## 2019-04-09 ENCOUNTER — ANESTHESIA EVENT (OUTPATIENT)
Dept: ENDOSCOPY | Facility: HOSPITAL | Age: 59
End: 2019-04-09
Payer: COMMERCIAL

## 2019-04-09 ENCOUNTER — HOSPITAL ENCOUNTER (OUTPATIENT)
Facility: HOSPITAL | Age: 59
Discharge: HOME | End: 2019-04-09
Attending: INTERNAL MEDICINE | Admitting: INTERNAL MEDICINE
Payer: COMMERCIAL

## 2019-04-09 ENCOUNTER — ANESTHESIA (OUTPATIENT)
Dept: ENDOSCOPY | Facility: HOSPITAL | Age: 59
End: 2019-04-09
Payer: COMMERCIAL

## 2019-04-09 VITALS
RESPIRATION RATE: 18 BRPM | SYSTOLIC BLOOD PRESSURE: 125 MMHG | HEIGHT: 63 IN | TEMPERATURE: 98 F | DIASTOLIC BLOOD PRESSURE: 84 MMHG | HEART RATE: 95 BPM | OXYGEN SATURATION: 100 % | BODY MASS INDEX: 28.35 KG/M2 | WEIGHT: 160 LBS

## 2019-04-09 DIAGNOSIS — Z12.11 SCREENING FOR MALIGNANT NEOPLASM OF COLON: ICD-10-CM

## 2019-04-09 PROCEDURE — 88305 TISSUE SPECIMEN TO PATHOLOGY - SURGERY: ICD-10-PCS | Mod: 26,,, | Performed by: PATHOLOGY

## 2019-04-09 PROCEDURE — 37000009 HC ANESTHESIA EA ADD 15 MINS: Performed by: INTERNAL MEDICINE

## 2019-04-09 PROCEDURE — 45380 PR COLONOSCOPY,BIOPSY: ICD-10-PCS | Mod: 33,,, | Performed by: INTERNAL MEDICINE

## 2019-04-09 PROCEDURE — 45380 COLONOSCOPY AND BIOPSY: CPT | Mod: 33,,, | Performed by: INTERNAL MEDICINE

## 2019-04-09 PROCEDURE — 27201012 HC FORCEPS, HOT/COLD, DISP: Performed by: INTERNAL MEDICINE

## 2019-04-09 PROCEDURE — 63600175 PHARM REV CODE 636 W HCPCS: Performed by: NURSE ANESTHETIST, CERTIFIED REGISTERED

## 2019-04-09 PROCEDURE — 25000003 PHARM REV CODE 250: Performed by: NURSE ANESTHETIST, CERTIFIED REGISTERED

## 2019-04-09 PROCEDURE — 45380 COLONOSCOPY AND BIOPSY: CPT | Performed by: INTERNAL MEDICINE

## 2019-04-09 PROCEDURE — 25000003 PHARM REV CODE 250: Performed by: INTERNAL MEDICINE

## 2019-04-09 PROCEDURE — 88305 TISSUE EXAM BY PATHOLOGIST: CPT | Performed by: PATHOLOGY

## 2019-04-09 PROCEDURE — 37000008 HC ANESTHESIA 1ST 15 MINUTES: Performed by: INTERNAL MEDICINE

## 2019-04-09 PROCEDURE — 88305 TISSUE EXAM BY PATHOLOGIST: CPT | Mod: 26,,, | Performed by: PATHOLOGY

## 2019-04-09 RX ORDER — PROPOFOL 10 MG/ML
VIAL (ML) INTRAVENOUS
Status: DISCONTINUED | OUTPATIENT
Start: 2019-04-09 | End: 2019-04-09

## 2019-04-09 RX ORDER — PROPOFOL 10 MG/ML
VIAL (ML) INTRAVENOUS CONTINUOUS PRN
Status: DISCONTINUED | OUTPATIENT
Start: 2019-04-09 | End: 2019-04-09

## 2019-04-09 RX ORDER — SODIUM CHLORIDE 9 MG/ML
INJECTION, SOLUTION INTRAVENOUS CONTINUOUS
Status: DISCONTINUED | OUTPATIENT
Start: 2019-04-09 | End: 2019-04-09 | Stop reason: HOSPADM

## 2019-04-09 RX ORDER — SODIUM CHLORIDE 9 MG/ML
INJECTION, SOLUTION INTRAVENOUS CONTINUOUS PRN
Status: DISCONTINUED | OUTPATIENT
Start: 2019-04-09 | End: 2019-04-09

## 2019-04-09 RX ORDER — SODIUM CHLORIDE 0.9 % (FLUSH) 0.9 %
10 SYRINGE (ML) INJECTION
Status: DISCONTINUED | OUTPATIENT
Start: 2019-04-09 | End: 2019-04-09 | Stop reason: HOSPADM

## 2019-04-09 RX ORDER — LIDOCAINE HCL/PF 100 MG/5ML
SYRINGE (ML) INTRAVENOUS
Status: DISCONTINUED | OUTPATIENT
Start: 2019-04-09 | End: 2019-04-09

## 2019-04-09 RX ADMIN — PROPOFOL 200 MCG/KG/MIN: 10 INJECTION, EMULSION INTRAVENOUS at 09:04

## 2019-04-09 RX ADMIN — SODIUM CHLORIDE: 0.9 INJECTION, SOLUTION INTRAVENOUS at 09:04

## 2019-04-09 RX ADMIN — PROPOFOL 100 MG: 10 INJECTION, EMULSION INTRAVENOUS at 09:04

## 2019-04-09 RX ADMIN — SODIUM CHLORIDE: 0.9 INJECTION, SOLUTION INTRAVENOUS at 08:04

## 2019-04-09 RX ADMIN — LIDOCAINE HYDROCHLORIDE 50 MG: 20 INJECTION, SOLUTION INTRAVENOUS at 09:04

## 2019-04-09 NOTE — TRANSFER OF CARE
"Anesthesia Transfer of Care Note    Patient: Nicole Perez    Procedure(s) Performed: Procedure(s) (LRB):  COLONOSCOPY/Golytely (N/A)    Patient location: GI    Anesthesia Type: MAC    Transport from OR: Transported from OR on room air with adequate spontaneous ventilation    Post pain: adequate analgesia    Post assessment: no apparent anesthetic complications    Post vital signs: stable    Level of consciousness: awake and alert    Nausea/Vomiting: no nausea/vomiting    Complications: none    Transfer of care protocol was followed      Last vitals:   Visit Vitals  /78   Pulse 102   Temp 36.7 °C (98.1 °F) (Temporal)   Resp 16   Ht 5' 3" (1.6 m)   Wt 72.6 kg (160 lb)   SpO2 100%   Breastfeeding? No   BMI 28.34 kg/m²     "

## 2019-04-09 NOTE — PROVATION PATIENT INSTRUCTIONS
Discharge Summary/Instructions after an Endoscopic Procedure  Patient Name: Nicole Perez  Patient MRN: [id number]  Patient YOB: 1960  Tuesday, April 09, 2019  Angela Staples MD  RESTRICTIONS:  During your procedure today, you received medications for sedation.  These   medications may affect your judgment, balance and coordination.  Therefore,   for 24 hours, you have the following restrictions:   - DO NOT drive a car, operate machinery, make legal/financial decisions,   sign important papers or drink alcohol.    ACTIVITY:  Today: no heavy lifting, straining or running due to procedural   sedation/anesthesia.  The following day: return to full activity including work.  DIET:  Eat and drink normally unless instructed otherwise.     TREATMENT FOR COMMON SIDE EFFECTS:  - Mild abdominal pain, nausea, belching, bloating or excessive gas:  rest,   eat lightly and use a heating pad.  - Sore Throat: treat with throat lozenges and/or gargle with warm salt   water.  - Because air was used during the procedure, expelling large amounts of air   from your rectum or belching is normal.  - If a bowel prep was taken, you may not have a bowel movement for 1-3 days.    This is normal.  SYMPTOMS TO WATCH FOR AND REPORT TO YOUR PHYSICIAN:  1. Abdominal pain or bloating, other than gas cramps.  2. Chest pain.  3. Back pain.  4. Signs of infection such as: chills or fever occurring within 24 hours   after the procedure.  5. Rectal bleeding, which would show as bright red, maroon, or black stools.   (A tablespoon of blood from the rectum is not serious, especially if   hemorrhoids are present.)  6. Vomiting.  7. Weakness or dizziness.  GO DIRECTLY TO THE NEAREST EMERGENCY ROOM IF YOU HAVE ANY OF THE FOLLOWING:      Difficulty breathing              Chills and/or fever over 101 F   Persistent vomiting and/or vomiting blood   Severe abdominal pain   Severe chest pain   Black, tarry stools   Bleeding- more than one  tablespoon   Any other symptom or condition that you feel may need urgent attention  Your doctor recommends these additional instructions:  If any biopsies were taken, your doctors clinic will contact you in 1 to 2   weeks with any results.  - Discharge patient to home (via wheelchair).   - Patient has a contact number available for emergencies.  The signs and   symptoms of potential delayed complications were discussed with the   patient.  Return to normal activities tomorrow.  Written discharge   instructions were provided to the patient.   - Resume previous diet.   - Continue present medications.   - Await pathology results.   - Repeat colonoscopy in 5 years for surveillance.  For questions, problems or results please call your physician - Angela Staples MD at Work:  [phone].  EMERGENCY PHONE NUMBER: [phone],  LAB RESULTS: [phone]  IF A COMPLICATION OR EMERGENCY SITUATION ARISES AND YOU ARE UNABLE TO REACH   YOUR PHYSICIAN - GO DIRECTLY TO THE EMERGENCY ROOM.  Angela Staples MD  4/9/2019 9:33:49 AM  This report has been verified and signed electronically.  [employer]

## 2019-04-09 NOTE — PLAN OF CARE
"Past Medical History:   Diagnosis Date    Abdominal bloating 02/03/2017    Abnormal Pap smear of cervix     Elevated liver function tests 02/21/2017    Foot swelling 02/03/2017    Bilateral    GERD (gastroesophageal reflux disease)     History of colon polyps     History of Helicobacter pylori infection     History of hematuria 02/21/2017    Hypercholesteremia 02/21/2017    Lactose intolerance 02/03/2017    Prediabetes 02/21/2017    Spasm of muscle     Diffuse muscle spasms "for many years." On meds for this.     Admission process complete. Patient ready for procedure. Plan of care reviewed with patient. Preoperative fasting appropriate for procedure and sedation. Call light in reach and bed in lowest position.     "

## 2019-04-09 NOTE — ANESTHESIA POSTPROCEDURE EVALUATION
Anesthesia Post Evaluation    Patient: Nicole Perez    Procedure(s) Performed: Procedure(s) (LRB):  COLONOSCOPY/Golytely (N/A)    Final Anesthesia Type: MAC  Patient location during evaluation: OPS  Patient participation: Yes- Able to Participate  Level of consciousness: awake and alert  Post-procedure vital signs: reviewed and stable  Airway patency: patent  PONV status at discharge: No PONV  Anesthetic complications: no      Cardiovascular status: blood pressure returned to baseline and hemodynamically stable  Respiratory status: spontaneous ventilation  Hydration status: euvolemic  Follow-up not needed.          Vitals Value Taken Time   /78 4/9/2019  8:05 AM   Temp 36.7 °C (98.1 °F) 4/9/2019  8:04 AM   Pulse 102 4/9/2019  9:33 AM   Resp 16 4/9/2019  9:33 AM   SpO2 100 % 4/9/2019  9:33 AM         No case tracking events are documented in the log.      Pain/Alina Score: Alina Score: 9 (4/9/2019  9:32 AM)

## 2019-04-09 NOTE — H&P
"[facility]  Gastroenterology  H&P    Patient Name: Nicole Perez  MRN: [id number]  Admission Date: 2019  Code Status: Full Code    Attending Provider: Angela Staples MD   Primary Care Physician: Werner Hou MD  Principal Problem:<principal problem not specified>    Subjective:     History of Present Illness: Colon cancer screening    Past Medical History:   Diagnosis Date    Abdominal bloating 2017    Abnormal Pap smear of cervix     Elevated liver function tests 2017    Foot swelling 2017    Bilateral    GERD (gastroesophageal reflux disease)     History of colon polyps     History of Helicobacter pylori infection     History of hematuria 2017    Hypercholesteremia 2017    Lactose intolerance 2017    Prediabetes 2017    Spasm of muscle     Diffuse muscle spasms "for many years." On meds for this.       Past Surgical History:   Procedure Laterality Date    breast reduction Bilateral      SECTION      CHOLECYSTECTOMY      COLONOSCOPY W/ POLYPECTOMY      HYSTERECTOMY      TOTAL ABDOMINAL HYSTERECTOMY W/ BILATERAL SALPINGOOPHORECTOMY      TOTAL REDUCTION MAMMOPLASTY      tummy tuck Bilateral        Review of patient's allergies indicates:  No Known Allergies  Family History     Problem Relation (Age of Onset)    Heart disease Father, Mother    Hypertension Father, Mother        Tobacco Use    Smoking status: Former Smoker     Packs/day: 0.50     Years: 25.00     Pack years: 12.50     Last attempt to quit: 2008     Years since quittin.1    Smokeless tobacco: Never Used   Substance and Sexual Activity    Alcohol use: No    Drug use: No    Sexual activity: Yes     Partners: Male     Birth control/protection: See Surgical Hx     Comment:      Review of Systems   Constitutional: Negative for appetite change and unexpected weight change.   Respiratory: Negative for apnea and chest tightness.  "   Cardiovascular: Negative for chest pain and palpitations.   Gastrointestinal: Negative for abdominal distention and abdominal pain.     Objective:     Vital Signs (Most Recent):  Temp: 98.1 °F (36.7 °C) (04/09/19 0804)  Pulse: 96 (04/09/19 0804)  Resp: 18 (04/09/19 0804)  BP: 139/78 (04/09/19 0805)  SpO2: 100 % (04/09/19 0804) Vital Signs (24h Range):  Temp:  [98.1 °F (36.7 °C)] 98.1 °F (36.7 °C)  Pulse:  [96] 96  Resp:  [18] 18  SpO2:  [100 %] 100 %  BP: (139)/(78) 139/78     Weight: 72.6 kg (160 lb) (04/09/19 0804)  Body mass index is 28.34 kg/m².    No intake or output data in the 24 hours ending 04/09/19 0833    Lines/Drains/Airways     Peripheral Intravenous Line                 Peripheral IV - Single Lumen 04/09/19 0818 Right;Medial Wrist less than 1 day                Physical Exam   Constitutional: She is oriented to person, place, and time. She appears well-developed and well-nourished. No distress.   HENT:   Head: Normocephalic and atraumatic.   Eyes: Conjunctivae are normal. No scleral icterus.   Neck: Normal range of motion. Neck supple. No tracheal deviation present. No thyromegaly present.   Cardiovascular: Normal rate and regular rhythm. Exam reveals no gallop and no friction rub.   No murmur heard.  Pulmonary/Chest: Effort normal and breath sounds normal. No respiratory distress. She has no wheezes.   Abdominal: Soft. Bowel sounds are normal. She exhibits no distension. There is no tenderness.   Musculoskeletal:        Right wrist: She exhibits normal range of motion and no tenderness.        Left wrist: She exhibits normal range of motion and no tenderness.   Lymphadenopathy:        Head (right side): No submental and no submandibular adenopathy present.        Head (left side): No submental and no submandibular adenopathy present.   Neurological: She is alert and oriented to person, place, and time.   Skin: Skin is warm and dry. No rash noted. She is not diaphoretic. No erythema.   Psychiatric:  She has a normal mood and affect. Her behavior is normal.   Nursing note and vitals reviewed.          Assessment/Plan:     Active Diagnoses:    Diagnosis Date Noted POA    Screening for malignant neoplasm of colon [Z12.11] 04/09/2019 Not Applicable      Problems Resolved During this Admission:     Plan  1. Colonoscopy    Angela Staples MD  Gastroenterology  [facility]

## 2019-04-09 NOTE — ANESTHESIA PREPROCEDURE EVALUATION
04/09/2019  Nicole Perez is a 58 y.o., female.    Anesthesia Evaluation      I have reviewed the Medications.     Review of Systems  Anesthesia Hx:  Denies Family Hx of Anesthesia complications.   Social:  Former Smoker, No Alcohol Use    Cardiovascular:   hyperlipidemia    Hepatic/GI:   GERD        Physical Exam  General:  Well nourished    Airway/Jaw/Neck:  Airway Findings: Mouth Opening: Normal Tongue: Normal  General Airway Assessment: Adult  Mallampati: II      Dental:  Dental Findings: In tact   Chest/Lungs:  Chest/Lungs Findings: Clear to auscultation, Normal Respiratory Rate     Heart/Vascular:  Heart Findings: Rate: Normal  Rhythm: Regular Rhythm        Mental Status:  Mental Status Findings:  Cooperative, Alert and Oriented         Anesthesia Plan  Type of Anesthesia, risks & benefits discussed:  Anesthesia Type:  MAC  Patient's Preference: MAC  Intra-op Monitoring Plan:   Intra-op Monitoring Plan Comments:   Post Op Pain Control Plan:   Post Op Pain Control Plan Comments:   Induction:   IV  Beta Blocker:  Patient is not currently on a Beta-Blocker (No further documentation required).       Informed Consent: Patient understands risks and agrees with Anesthesia plan.  Questions answered. Anesthesia consent signed with patient.  ASA Score: 2     Day of Surgery Review of History & Physical: I have interviewed and examined the patient. I have reviewed the patient's H&P dated:            Ready For Surgery From Anesthesia Perspective.

## 2019-04-29 ENCOUNTER — TELEPHONE (OUTPATIENT)
Dept: FAMILY MEDICINE | Facility: CLINIC | Age: 59
End: 2019-04-29

## 2019-04-29 NOTE — TELEPHONE ENCOUNTER
----- Message from Calvin Cota sent at 4/29/2019 12:50 PM CDT -----  Contact: Nicole [phone]  Type:  Patient Requesting Referral    Who Called:Nicole    Referral to What Specialty:ENT     Reason for Referral:hoarseness and throat pain    Does the patient want the referral with a specific physician?:no    [facility]    Would the patient rather a call back or a response via [facility]? Call back if possible    Best Call Back Number:[phone]

## 2019-04-29 NOTE — TELEPHONE ENCOUNTER
"Pt is requesting a referrral to an ENT due to pain in throat and horse, she also states that when she goes to the restroom to have a bowel movement pt states she "makes noise", I got her an appt scheduled for may 7th. At 10:20AM  "

## 2019-05-07 ENCOUNTER — OFFICE VISIT (OUTPATIENT)
Dept: FAMILY MEDICINE | Facility: CLINIC | Age: 59
End: 2019-05-07
Payer: COMMERCIAL

## 2019-05-07 VITALS
HEIGHT: 63 IN | DIASTOLIC BLOOD PRESSURE: 78 MMHG | BODY MASS INDEX: 28.52 KG/M2 | OXYGEN SATURATION: 100 % | WEIGHT: 160.94 LBS | HEART RATE: 87 BPM | SYSTOLIC BLOOD PRESSURE: 112 MMHG

## 2019-05-07 DIAGNOSIS — R09.82 POSTNASAL DRIP: ICD-10-CM

## 2019-05-07 DIAGNOSIS — R13.10 DYSPHAGIA, UNSPECIFIED TYPE: Primary | ICD-10-CM

## 2019-05-07 DIAGNOSIS — E78.2 MIXED HYPERLIPIDEMIA: ICD-10-CM

## 2019-05-07 DIAGNOSIS — R73.03 PRE-DIABETES: ICD-10-CM

## 2019-05-07 DIAGNOSIS — R49.0 HOARSENESS: ICD-10-CM

## 2019-05-07 PROCEDURE — 99999 PR PBB SHADOW E&M-EST. PATIENT-LVL III: CPT | Mod: PBBFAC,,, | Performed by: FAMILY MEDICINE

## 2019-05-07 PROCEDURE — 3008F PR BODY MASS INDEX (BMI) DOCUMENTED: ICD-10-PCS | Mod: CPTII,S$GLB,, | Performed by: FAMILY MEDICINE

## 2019-05-07 PROCEDURE — 99999 PR PBB SHADOW E&M-EST. PATIENT-LVL III: ICD-10-PCS | Mod: PBBFAC,,, | Performed by: FAMILY MEDICINE

## 2019-05-07 PROCEDURE — 99214 OFFICE O/P EST MOD 30 MIN: CPT | Mod: S$GLB,,, | Performed by: FAMILY MEDICINE

## 2019-05-07 PROCEDURE — 3008F BODY MASS INDEX DOCD: CPT | Mod: CPTII,S$GLB,, | Performed by: FAMILY MEDICINE

## 2019-05-07 PROCEDURE — 99214 PR OFFICE/OUTPT VISIT, EST, LEVL IV, 30-39 MIN: ICD-10-PCS | Mod: S$GLB,,, | Performed by: FAMILY MEDICINE

## 2019-05-07 RX ORDER — FLUTICASONE PROPIONATE 50 MCG
2 SPRAY, SUSPENSION (ML) NASAL DAILY
Qty: 16 G | Refills: 5 | Status: SHIPPED | OUTPATIENT
Start: 2019-05-07 | End: 2021-02-02

## 2019-05-07 RX ORDER — MINERAL OIL
180 ENEMA (ML) RECTAL DAILY
Qty: 90 TABLET | Refills: 3 | Status: SHIPPED | OUTPATIENT
Start: 2019-05-07 | End: 2021-02-02

## 2019-05-07 NOTE — PROGRESS NOTES
"Subjective:       Patient ID: Nicole Perez is a 58 y.o. female.    Chief Complaint: Sore Throat (requesting referral to ENT. ) and Hoarse    57 yo F pt, new to me (regularly followed by Dr. Hou), with PMH significnant for HLD, prediabetes, and muscle spasms. She presents with desire for referral to ENT.  She reports she has had difficulty with swallowing x several years. Symptoms initially began after accidentally (?) aspirating saliva while washing her hair 6-7 years ago. After this episode she began having frequent choking episodes with po solid and liquid intake. Also experienced choking/coughing symptoms during the night while sleeping. Reports having an endoscopy performed in [place] at that time which was normal per patient. She had a speech and swallow test at [facility] 2/2017 which was normal. Had a f/u visit with GI 2/2017 regarding symptoms and rx'd omeprazole 40 mg--this resolved her night symptoms. At present she is experiencing post-nasal drip and progressively worsening intermittent hoarseness. Also notices a "whistling" sound with valsalva maneuver when passing bowels.   Continues to have episodes of choking with eating. States that she is fearful when eating 2/2 episodes. Often has throat pain/chest tightness when swallowing. Reports that she is followed by a neurologist and has had a negative work-up for difficulties with swallowing.      Review of Systems   Constitutional: Negative for activity change, appetite change, chills, fever and unexpected weight change.   HENT: Positive for postnasal drip, trouble swallowing and voice change. Negative for sore throat.    Eyes: Negative for redness, itching and visual disturbance.   Respiratory: Negative for cough, chest tightness, shortness of breath and wheezing.    Cardiovascular: Negative for chest pain and palpitations.   Gastrointestinal: Negative for abdominal pain, constipation, diarrhea, nausea and vomiting.   Genitourinary: Negative for " "dysuria, frequency, hematuria, urgency, vaginal bleeding and vaginal discharge.   Skin: Negative for rash.   Neurological: Negative for dizziness, syncope and headaches.   Psychiatric/Behavioral: Negative for dysphoric mood and suicidal ideas. The patient is not nervous/anxious.          Past Medical History:   Diagnosis Date    Abdominal bloating 2017    Abnormal Pap smear of cervix     Elevated liver function tests 2017    Foot swelling 2017    Bilateral    GERD (gastroesophageal reflux disease)     History of colon polyps     History of Helicobacter pylori infection     History of hematuria 2017    Hypercholesteremia 2017    Lactose intolerance 2017    Prediabetes 2017    Spasm of muscle     Diffuse muscle spasms "for many years." On meds for this.       Patient Active Problem List   Diagnosis    Varicose veins of both legs with edema    Pain of lower extremity    History of smoking    Mixed hyperlipidemia    Pre-diabetes    Muscle spasm    Venous insufficiency of both lower extremities    Screening for malignant neoplasm of colon       Past Surgical History:   Procedure Laterality Date    breast reduction Bilateral      SECTION      CHOLECYSTECTOMY      COLONOSCOPY W/ POLYPECTOMY      COLONOSCOPY/Golytely N/A 2019    [facility]    HYSTERECTOMY      TOTAL ABDOMINAL HYSTERECTOMY W/ BILATERAL SALPINGOOPHORECTOMY      TOTAL REDUCTION MAMMOPLASTY      tummy prettyck Bilateral        Family History   Problem Relation Age of Onset    Heart disease Father     Hypertension Father     Heart disease Mother     Hypertension Mother        Social History     Tobacco Use   Smoking Status Former Smoker    Packs/day: 0.50    Years: 25.00    Pack years: 12.50    Last attempt to quit: 2008    Years since quittin.2   Smokeless Tobacco Never Used       Social History     Social History Narrative    " "Works in international customer service, works from home       Objective:        Vitals:    05/07/19 1119   BP: 112/78   Pulse: 87   SpO2: 100%   Weight: 73 kg (160 lb 15 oz)   Height: 5' 3" (1.6 m)       Physical Exam   Constitutional: She is oriented to person, place, and time. She appears well-developed and well-nourished. No distress.   HENT:   Head: Normocephalic and atraumatic.   Right Ear: Tympanic membrane, external ear and ear canal normal.   Left Ear: Tympanic membrane, external ear and ear canal normal.   Nose: Mucosal edema (+ inflamed/boggy inferior nasal turbinate) present. No rhinorrhea. Right sinus exhibits no maxillary sinus tenderness and no frontal sinus tenderness. Left sinus exhibits no maxillary sinus tenderness and no frontal sinus tenderness.   Mouth/Throat: Oropharynx is clear and moist. No oropharyngeal exudate or posterior oropharyngeal erythema.   Eyes: Conjunctivae and EOM are normal. No scleral icterus.   Neck: Normal range of motion. Neck supple. No thyromegaly present.   Cardiovascular: Normal rate, regular rhythm and normal heart sounds. Exam reveals no gallop and no friction rub.   No murmur heard.  Pulmonary/Chest: Effort normal and breath sounds normal. She has no wheezes. She has no rales.   Musculoskeletal: Normal range of motion. She exhibits no edema.   Lymphadenopathy:     She has no cervical adenopathy.   Neurological: She is alert and oriented to person, place, and time. No cranial nerve deficit.   Skin: Skin is warm and dry. No rash noted. No erythema.   Psychiatric: She has a normal mood and affect. Her behavior is normal.       Assessment:       1. Dysphagia, unspecified type    2. Hoarseness    3. Postnasal drip    4. Mixed hyperlipidemia    5. Pre-diabetes        Plan:       Nicole was seen today for sore throat and hoarse.    Diagnoses and all orders for this visit:    Dysphagia, unspecified type  -     Ambulatory referral to ENT    Hoarseness  -     Ambulatory " referral to ENT    Postnasal drip  -     fluticasone propionate (FLONASE) 50 mcg/actuation nasal spray; 2 sprays (100 mcg total) by Each Nare route once daily.  -     fexofenadine (ALLEGRA) 180 MG tablet; Take 1 tablet (180 mg total) by mouth once daily.    Mixed hyperlipidemia    Pre-diabetes    Dysphagia/Hoarseness   Ongoing x several years   Reports negative endoscopy in [place] 6-7 years  Negative speech and swallow study February 2017  Adherent with Omeprazole 40 mg daily which has helped night symptoms  Current followed by Neurologist outside of [facility]--reports negative workup.  Will refer to ENT for further evaluation and management    Postnasal Drip   Signs of allergic rhinitis on exam  Advised to start over-the-counter Flonase 2 sprays each nostril daily and Allegra 180 mg daily    HLD  , , HDL 42,  2/2017  Rx'd pravastatin 20 mg qhs  Due for repeat FLP.   Likely will need to change statin to atorvastatin 40-80 mg qhs    Prediabetes  A1c 5.8 2/2017  Due for repeat    F/U with PCP regarding chronic medical conditions

## 2019-05-09 ENCOUNTER — TELEPHONE (OUTPATIENT)
Dept: FAMILY MEDICINE | Facility: CLINIC | Age: 59
End: 2019-05-09

## 2019-05-10 DIAGNOSIS — E78.2 MIXED HYPERLIPIDEMIA: ICD-10-CM

## 2019-05-10 RX ORDER — PRAVASTATIN SODIUM 20 MG/1
20 TABLET ORAL DAILY
Qty: 90 TABLET | Refills: 0 | OUTPATIENT
Start: 2019-05-10

## 2019-05-31 DIAGNOSIS — E78.2 MIXED HYPERLIPIDEMIA: ICD-10-CM

## 2019-05-31 RX ORDER — PRAVASTATIN SODIUM 20 MG/1
20 TABLET ORAL DAILY
Qty: 90 TABLET | Refills: 0 | Status: SHIPPED | OUTPATIENT
Start: 2019-05-31 | End: 2019-08-17 | Stop reason: SDUPTHER

## 2019-06-20 ENCOUNTER — OFFICE VISIT (OUTPATIENT)
Dept: OTOLARYNGOLOGY | Facility: CLINIC | Age: 59
End: 2019-06-20
Payer: COMMERCIAL

## 2019-06-20 VITALS
DIASTOLIC BLOOD PRESSURE: 76 MMHG | BODY MASS INDEX: 28.87 KG/M2 | HEIGHT: 63 IN | SYSTOLIC BLOOD PRESSURE: 138 MMHG | WEIGHT: 162.94 LBS

## 2019-06-20 DIAGNOSIS — K21.9 LARYNGOPHARYNGEAL REFLUX: Primary | ICD-10-CM

## 2019-06-20 PROCEDURE — 99204 PR OFFICE/OUTPT VISIT, NEW, LEVL IV, 45-59 MIN: ICD-10-PCS | Mod: 25,S$GLB,, | Performed by: OTOLARYNGOLOGY

## 2019-06-20 PROCEDURE — 99204 OFFICE O/P NEW MOD 45 MIN: CPT | Mod: 25,S$GLB,, | Performed by: OTOLARYNGOLOGY

## 2019-06-20 PROCEDURE — 31575 PR LARYNGOSCOPY, FLEXIBLE; DIAGNOSTIC: ICD-10-PCS | Mod: S$GLB,,, | Performed by: OTOLARYNGOLOGY

## 2019-06-20 PROCEDURE — 3008F BODY MASS INDEX DOCD: CPT | Mod: CPTII,S$GLB,, | Performed by: OTOLARYNGOLOGY

## 2019-06-20 PROCEDURE — 31575 DIAGNOSTIC LARYNGOSCOPY: CPT | Mod: S$GLB,,, | Performed by: OTOLARYNGOLOGY

## 2019-06-20 PROCEDURE — 3008F PR BODY MASS INDEX (BMI) DOCUMENTED: ICD-10-PCS | Mod: CPTII,S$GLB,, | Performed by: OTOLARYNGOLOGY

## 2019-06-20 NOTE — PATIENT INSTRUCTIONS
ACID REFLUX   What is acid reflux?    When we eat, food passes from the throat and into the stomach through a tube called the esophagus. At the bottom of the esophagus is a ring of muscles that acts as a valve between the esophagus and stomach, called the lower esophageal sphincter. Smoking, alcohol, and certain types of food may weaken the sphincter, so it may stop closing properly. The contents in the stomach then may leak back, or reflux, into the esophagus. This problem is called gastroesophageal reflux disease (GERD). Symptoms of GERD include heartburn, belching, regurgitation of stomach contents, and swallowing difficulties.    Sometimes, the stomach acid travels up through the esophagus and spills into the larynx or pharynx (voice box). This is called laryngopharyngeal reflux (LPR) and is irritating to the vocal folds and surrounding tissues. Often, patients with LPR do not experience heartburn as a symptom. More commonly, symptoms of LPR include hoarseness, excessive mucous resulting in frequent throat clearing, post-nasal drip, coughing, throat soreness or burning, choking episodes, difficulty swallowing, and sensation of a lump in the throat.     How is acid reflux treated?   Treatment for acid reflux can involve any combination of medication, lifestyle modifications, and surgery.   · Medications. Your doctor may prescribe a proton pump inhibitor (PPI) or an H2 blocker. If you are prescribed a PPI, take in on an empty stomach in the morning 30 minutes prior to eating breakfast. Keep in mind that it may take 4-6 weeks before symptoms begin to resolve, so do not stop medications without consulting your doctor.   · Lifestyle and dietary modifications. Eat smaller meals at a slower pace. Avoid over-eating. If you are overweight, try to lose weight. Do not lie down or exercise directly after eating; eat your last meal of the day at least 2-3 hours prior to going to sleep. Avoid tight-fitting clothes. If you  are a smoker, reduce or quit smoking. Elevate your head of bed 4-6 inches by putting phone books under the legs at the head of your bed or buy a wedge pillow, but do not use more than two regular pillows as this causes the body to curl and compresses your stomach.     Food group Foods to avoid to reduce reflux   Beverages  Whole milk, 2% milk, chocolate milk/hot chocolate, alcohol, coffee (regular and decaf), caffeinated tea, mint tea, carbonated beverages, citrus juice    Breads/grains Commercial sweet rolls, doughnuts, croissants, and other high-fat pastries    Fruits and vegetables Fried or cream-style vegetables, tomatoes, tomato-based products, citrus fruits, hot peppers    Soups and seasonings Cream, cheese, tomato-based soups, vinegar    Meats and proteins Fatty or fried meat/fish, lucero, sausage, pepperoni, lunch meat, fried eggs    Fats and oil Lard, lucero drippings, salt pork, meat drippings, gravies, highly seasoned salad dressings, nuts    Sweets/desserts Anything made with or from chocolate, peppermint, spearmint, whole milk, or cream; high-fat pastries, gum, hard candy

## 2019-06-20 NOTE — PROGRESS NOTES
"Chief Complaint   Patient presents with    Other     feels like throat closes at time when breathes    Sore Throat     horseness         58 y.o. female presents with longstanding history of throat issues. Has had workup previously in [place] (10 years ago) as well as here with modified barium swallow which was normal in 3/17. At this time she was started on omeprazole which did help with her symptoms. Reports that she feels that sometimes it is hard to swallow certain foods, but is able to eat with small bites and sips of water. Also with some hoarseness and globus sensation.       Review of Systems     Constitutional: Negative for fatigue and unexpected weight change.   HENT: per HPI.  Eyes: Negative for visual disturbance.   Respiratory: Negative for shortness of breath, hemoptysis  Cardiovascular: Negative for chest pain and palpitations.   Genitourinary: Negative for dysuria and difficulty urinating.   Musculoskeletal: Negative for decreased ROM, back pain.   Skin: Negative for rash, sunburn, itching.   Neurological: Negative for dizziness and seizures.   Hematological: Negative for adenopathy. Does not bruise/bleed easily.   Psychiatric/Behavioral: Negative for agitation. The patient is not nervous/anxious.   Endocrine: Negative for rapid weight loss/weight gain, heat/cold intolerance.     Past Medical History:   Diagnosis Date    Abdominal bloating 2017    Abnormal Pap smear of cervix     Elevated liver function tests 2017    Foot swelling 2017    Bilateral    GERD (gastroesophageal reflux disease)     History of colon polyps     History of Helicobacter pylori infection     History of hematuria 2017    Hypercholesteremia 2017    Lactose intolerance 2017    Prediabetes 2017    Spasm of muscle     Diffuse muscle spasms "for many years." On meds for this.       Past Surgical History:   Procedure Laterality Date    breast reduction Bilateral      SECTION "      CHOLECYSTECTOMY      COLONOSCOPY W/ POLYPECTOMY      COLONOSCOPY/Golytely N/A 4/9/2019    Performed by [facility] at [facility]    HYSTERECTOMY      TOTAL ABDOMINAL HYSTERECTOMY W/ BILATERAL SALPINGOOPHORECTOMY      TOTAL REDUCTION MAMMOPLASTY      tummy tuck Bilateral        family history includes Heart disease in her father and mother; Hypertension in her father and mother.    Pt  reports that she quit smoking about 11 years ago. She has a 12.50 pack-year smoking history. She has never used smokeless tobacco. She reports that she does not drink alcohol or use drugs.    Review of patient's allergies indicates:  No Known Allergies     Physical Exam    Vitals:    06/20/19 0909   BP: 138/76     Body mass index is 28.86 kg/m².    Physical Exam   Constitutional: She is oriented to person, place, and time. She appears well-developed and well-nourished. No distress.   HENT:   Head: Normocephalic and atraumatic.   Right Ear: Hearing, tympanic membrane, external ear and ear canal normal. Tympanic membrane mobility is normal. No middle ear effusion. No decreased hearing is noted.   Left Ear: Hearing, tympanic membrane, external ear and ear canal normal. Tympanic membrane mobility is normal.  No middle ear effusion. No decreased hearing is noted.   Nose: Nose normal.   Mouth/Throat: Oropharynx is clear and moist.   Eyes: Pupils are equal, round, and reactive to light. Conjunctivae, EOM and lids are normal. Right eye exhibits no discharge. Left eye exhibits no discharge.   Neck: Trachea normal, normal range of motion and phonation normal. Neck supple. No tracheal tenderness present. No tracheal deviation, no edema and no erythema present. No thyroid mass and no thyromegaly present.   Cardiovascular: Normal heart sounds.   Pulmonary/Chest: Breath sounds normal. No stridor.   Abdominal: Soft.   Lymphadenopathy:     She has no cervical adenopathy.   Neurological: She is alert and oriented to person, place,  and time.   Skin: Skin is warm and dry. No rash noted. She is not diaphoretic. No erythema. No pallor.   Psychiatric: She has a normal mood and affect.   Nursing note and vitals reviewed.    Procedure: Flexible laryngoscopy  In order to fully examine the upper aerodigestive tract, including the larynx, in a patient with a hyperactive gag reflex, flexible endoscopy is required.  After explaining the procedure and obtaining verbal consent, a timeout was performed with the patient's participation according to the universal protocol. Both nasal cavities were anesthetized with 4% Xylocaine spray mixed with Charles-Synephrine. The flexible laryngoscope was inserted into the nasal cavity and advanced to visualize the nasal cavity, nasopharynx, the posterior oropharynx, hypopharynx, and the endolarynx with the above findings noted. The scope was removed and the procedure terminated. The patient tolerated this procedure well without apparent complication.     NP: No lesions of torus or posterior wall  OP: No lesions of posterior wall or BOT. BOT is soft to palpation  Larynx: No lesions of glottic or supraglottic larynx. Normal vocal fold mobility. There is posterior commissure erythema and edema.  HP: No lesions of pyriform sinuses or postcricoid region    Assessment     1. Laryngopharyngeal reflux          Plan  In summary, Ms. Perez is a 58 year old female with symptoms and findings consistent with LPR. Discussed treatment with lifestyle modifications and diet changes, handout given. All questions were answered. Return to clinic if symptoms fail to improve or worsen.

## 2019-06-20 NOTE — LETTER
June 20, 2019      Rafael Knox MD  [address]  [address]  [place] [address]           [facility]  [address] [facility] [address]  [place] [address]  Phone: [phone]          Patient: Nicole Perez   MR Number: [id number]   YOB: 1960   Date of Visit: 6/20/2019       Dear Dr. Rafael Knox:    Thank you for referring Nicole Perez to me for evaluation. Attached you will find relevant portions of my assessment and plan of care.    If you have questions, please do not hesitate to call me. I look forward to following Nicole Perez along with you.    Sincerely,    Veronica Vitale MD    Enclosure  CC:  No Recipients    If you would like to receive this communication electronically, please contact [email or web address] or [phone] to request more information on [facility] Link access.    For providers and/or their staff who would like to refer a patient to [facility], please contact us through our one-stop-shop provider referral line, [facility], at [phone].    If you feel you have received this communication in error or would no longer like to receive these types of communications, please e-mail [email or web address]

## 2019-08-14 RX ORDER — OMEPRAZOLE 40 MG/1
CAPSULE, DELAYED RELEASE ORAL
Qty: 90 CAPSULE | Refills: 1 | Status: SHIPPED | OUTPATIENT
Start: 2019-08-14 | End: 2020-02-10

## 2019-08-17 DIAGNOSIS — E78.2 MIXED HYPERLIPIDEMIA: ICD-10-CM

## 2019-08-20 RX ORDER — PRAVASTATIN SODIUM 20 MG/1
TABLET ORAL
Qty: 90 TABLET | Refills: 0 | Status: SHIPPED | OUTPATIENT
Start: 2019-08-20 | End: 2019-11-19 | Stop reason: SDUPTHER

## 2019-11-19 DIAGNOSIS — E78.2 MIXED HYPERLIPIDEMIA: ICD-10-CM

## 2019-11-20 RX ORDER — PRAVASTATIN SODIUM 20 MG/1
TABLET ORAL
Qty: 90 TABLET | Refills: 0 | Status: SHIPPED | OUTPATIENT
Start: 2019-11-20 | End: 2020-02-24

## 2020-02-10 RX ORDER — OMEPRAZOLE 40 MG/1
CAPSULE, DELAYED RELEASE ORAL
Qty: 90 CAPSULE | Refills: 1 | Status: SHIPPED | OUTPATIENT
Start: 2020-02-10 | End: 2020-09-30

## 2020-02-24 DIAGNOSIS — E78.2 MIXED HYPERLIPIDEMIA: ICD-10-CM

## 2020-02-24 RX ORDER — PRAVASTATIN SODIUM 20 MG/1
TABLET ORAL
Qty: 90 TABLET | Refills: 0 | Status: SHIPPED | OUTPATIENT
Start: 2020-02-24 | End: 2020-07-10 | Stop reason: SDUPTHER

## 2020-03-19 ENCOUNTER — PATIENT OUTREACH (OUTPATIENT)
Dept: ADMINISTRATIVE | Facility: HOSPITAL | Age: 60
End: 2020-03-19

## 2020-07-10 DIAGNOSIS — E78.2 MIXED HYPERLIPIDEMIA: ICD-10-CM

## 2020-07-10 RX ORDER — PRAVASTATIN SODIUM 20 MG/1
20 TABLET ORAL DAILY
Qty: 90 TABLET | Refills: 0 | Status: SHIPPED | OUTPATIENT
Start: 2020-07-10 | End: 2020-10-06 | Stop reason: SDUPTHER

## 2020-07-10 NOTE — TELEPHONE ENCOUNTER
----- Message from Aleyda Urena sent at 7/10/2020 11:28 AM CDT -----  Contact: PATIENT   [phone]  Refill request for pravastatin (PRAVACHOL) 20 MG tablet  PHARMACY: [facility]/PHARMACY #[id number] - [place], [place] - [address] [facility].

## 2020-10-05 ENCOUNTER — PATIENT MESSAGE (OUTPATIENT)
Dept: ADMINISTRATIVE | Facility: HOSPITAL | Age: 60
End: 2020-10-05

## 2020-10-15 DIAGNOSIS — E78.2 MIXED HYPERLIPIDEMIA: ICD-10-CM

## 2020-10-15 RX ORDER — PRAVASTATIN SODIUM 20 MG/1
20 TABLET ORAL DAILY
Qty: 90 TABLET | Refills: 0 | Status: SHIPPED | OUTPATIENT
Start: 2020-10-15 | End: 2021-02-22 | Stop reason: ALTCHOICE

## 2020-10-15 NOTE — TELEPHONE ENCOUNTER
----- Message from Lilly Palma sent at 10/15/2020  4:10 PM CDT -----  Type:  RX Refill Request    Who Called: Patient  Refill or New Rx:refill  RX Name and Strength:pravastatin (PRAVACHOL) 20 MG tablet  How is the patient currently taking it? (ex. 1XDay):  Is this a 30 day or 90 day RX:      Preferred Pharmacy with phone number:[facility]/pharmacy #[id number] - [place] - [address]. [phone] (Phone)  [phone] (Fax)      Local or Mail Order:  Ordering Provider:   Would the patient rather a call back or a response via [facility]?    Best Call Back Number: [phone]  Additional Information:

## 2021-01-04 ENCOUNTER — PATIENT MESSAGE (OUTPATIENT)
Dept: ADMINISTRATIVE | Facility: HOSPITAL | Age: 61
End: 2021-01-04

## 2021-01-06 DIAGNOSIS — Z12.31 OTHER SCREENING MAMMOGRAM: ICD-10-CM

## 2021-01-27 ENCOUNTER — TELEPHONE (OUTPATIENT)
Dept: CARDIOLOGY | Facility: CLINIC | Age: 61
End: 2021-01-27

## 2021-01-27 DIAGNOSIS — E78.2 MIXED HYPERLIPIDEMIA: Primary | ICD-10-CM

## 2021-01-27 DIAGNOSIS — I87.2 VENOUS INSUFFICIENCY OF BOTH LOWER EXTREMITIES: ICD-10-CM

## 2021-01-27 DIAGNOSIS — M79.606 PAIN OF LOWER EXTREMITY, UNSPECIFIED LATERALITY: ICD-10-CM

## 2021-01-27 DIAGNOSIS — R06.02 SOB (SHORTNESS OF BREATH) ON EXERTION: ICD-10-CM

## 2021-01-27 DIAGNOSIS — I83.893 VARICOSE VEINS OF BOTH LEGS WITH EDEMA: ICD-10-CM

## 2021-01-27 DIAGNOSIS — R60.9 EDEMA, UNSPECIFIED TYPE: ICD-10-CM

## 2021-02-02 ENCOUNTER — OFFICE VISIT (OUTPATIENT)
Dept: FAMILY MEDICINE | Facility: CLINIC | Age: 61
End: 2021-02-02
Payer: COMMERCIAL

## 2021-02-02 VITALS
OXYGEN SATURATION: 99 % | DIASTOLIC BLOOD PRESSURE: 78 MMHG | BODY MASS INDEX: 28.21 KG/M2 | HEIGHT: 63 IN | SYSTOLIC BLOOD PRESSURE: 138 MMHG | WEIGHT: 159.19 LBS | HEART RATE: 98 BPM

## 2021-02-02 DIAGNOSIS — Z00.00 ANNUAL PHYSICAL EXAM: Primary | ICD-10-CM

## 2021-02-02 DIAGNOSIS — E78.2 MIXED HYPERLIPIDEMIA: ICD-10-CM

## 2021-02-02 DIAGNOSIS — K21.9 GASTROESOPHAGEAL REFLUX DISEASE WITHOUT ESOPHAGITIS: ICD-10-CM

## 2021-02-02 DIAGNOSIS — E66.3 OVERWEIGHT (BMI 25.0-29.9): ICD-10-CM

## 2021-02-02 DIAGNOSIS — Z23 NEED FOR SHINGLES VACCINE: ICD-10-CM

## 2021-02-02 DIAGNOSIS — R03.0 PREHYPERTENSION: ICD-10-CM

## 2021-02-02 DIAGNOSIS — M62.838 MUSCLE SPASMS OF BOTH LOWER EXTREMITIES: ICD-10-CM

## 2021-02-02 DIAGNOSIS — R73.03 PRE-DIABETES: ICD-10-CM

## 2021-02-02 PROCEDURE — 1126F AMNT PAIN NOTED NONE PRSNT: CPT | Mod: S$GLB,,, | Performed by: FAMILY MEDICINE

## 2021-02-02 PROCEDURE — 99999 PR PBB SHADOW E&M-EST. PATIENT-LVL III: ICD-10-PCS | Mod: PBBFAC,,, | Performed by: FAMILY MEDICINE

## 2021-02-02 PROCEDURE — 99396 PR PREVENTIVE VISIT,EST,40-64: ICD-10-PCS | Mod: S$GLB,,, | Performed by: FAMILY MEDICINE

## 2021-02-02 PROCEDURE — 3008F PR BODY MASS INDEX (BMI) DOCUMENTED: ICD-10-PCS | Mod: CPTII,S$GLB,, | Performed by: FAMILY MEDICINE

## 2021-02-02 PROCEDURE — 3008F BODY MASS INDEX DOCD: CPT | Mod: CPTII,S$GLB,, | Performed by: FAMILY MEDICINE

## 2021-02-02 PROCEDURE — 1126F PR PAIN SEVERITY QUANTIFIED, NO PAIN PRESENT: ICD-10-PCS | Mod: S$GLB,,, | Performed by: FAMILY MEDICINE

## 2021-02-02 PROCEDURE — 99999 PR PBB SHADOW E&M-EST. PATIENT-LVL III: CPT | Mod: PBBFAC,,, | Performed by: FAMILY MEDICINE

## 2021-02-02 PROCEDURE — 99396 PREV VISIT EST AGE 40-64: CPT | Mod: S$GLB,,, | Performed by: FAMILY MEDICINE

## 2021-02-02 RX ORDER — ZOSTER VACCINE RECOMBINANT, ADJUVANTED 50 MCG/0.5
0.5 KIT INTRAMUSCULAR ONCE
Qty: 1 EACH | Refills: 0 | Status: SHIPPED | OUTPATIENT
Start: 2021-02-02 | End: 2021-02-02

## 2021-02-03 ENCOUNTER — LAB VISIT (OUTPATIENT)
Dept: LAB | Facility: HOSPITAL | Age: 61
End: 2021-02-03
Attending: FAMILY MEDICINE
Payer: COMMERCIAL

## 2021-02-03 DIAGNOSIS — M62.838 MUSCLE SPASMS OF BOTH LOWER EXTREMITIES: ICD-10-CM

## 2021-02-03 DIAGNOSIS — Z00.00 ANNUAL PHYSICAL EXAM: ICD-10-CM

## 2021-02-03 DIAGNOSIS — E78.2 MIXED HYPERLIPIDEMIA: ICD-10-CM

## 2021-02-03 DIAGNOSIS — R73.03 PRE-DIABETES: ICD-10-CM

## 2021-02-03 LAB
ALBUMIN SERPL BCP-MCNC: 3.8 G/DL (ref 3.5–5.2)
ALP SERPL-CCNC: 88 U/L (ref 55–135)
ALT SERPL W/O P-5'-P-CCNC: 49 U/L (ref 10–44)
ANION GAP SERPL CALC-SCNC: 9 MMOL/L (ref 8–16)
AST SERPL-CCNC: 25 U/L (ref 10–40)
BASOPHILS # BLD AUTO: 0.04 K/UL (ref 0–0.2)
BASOPHILS NFR BLD: 0.6 % (ref 0–1.9)
BILIRUB SERPL-MCNC: 0.3 MG/DL (ref 0.1–1)
BUN SERPL-MCNC: 11 MG/DL (ref 6–20)
CALCIUM SERPL-MCNC: 8.9 MG/DL (ref 8.7–10.5)
CHLORIDE SERPL-SCNC: 109 MMOL/L (ref 95–110)
CHOLEST SERPL-MCNC: 251 MG/DL (ref 120–199)
CHOLEST/HDLC SERPL: 6.3 {RATIO} (ref 2–5)
CO2 SERPL-SCNC: 24 MMOL/L (ref 23–29)
CREAT SERPL-MCNC: 0.7 MG/DL (ref 0.5–1.4)
DIFFERENTIAL METHOD: ABNORMAL
EOSINOPHIL # BLD AUTO: 0.1 K/UL (ref 0–0.5)
EOSINOPHIL NFR BLD: 2 % (ref 0–8)
ERYTHROCYTE [DISTWIDTH] IN BLOOD BY AUTOMATED COUNT: 12.7 % (ref 11.5–14.5)
EST. GFR  (AFRICAN AMERICAN): >60 ML/MIN/1.73 M^2
EST. GFR  (NON AFRICAN AMERICAN): >60 ML/MIN/1.73 M^2
GLUCOSE SERPL-MCNC: 114 MG/DL (ref 70–110)
HCT VFR BLD AUTO: 38.6 % (ref 37–48.5)
HDLC SERPL-MCNC: 40 MG/DL (ref 40–75)
HDLC SERPL: 15.9 % (ref 20–50)
HGB BLD-MCNC: 12 G/DL (ref 12–16)
IMM GRANULOCYTES # BLD AUTO: 0.02 K/UL (ref 0–0.04)
IMM GRANULOCYTES NFR BLD AUTO: 0.3 % (ref 0–0.5)
LDLC SERPL CALC-MCNC: 143.4 MG/DL (ref 63–159)
LYMPHOCYTES # BLD AUTO: 3 K/UL (ref 1–4.8)
LYMPHOCYTES NFR BLD: 45.6 % (ref 18–48)
MAGNESIUM SERPL-MCNC: 1.9 MG/DL (ref 1.6–2.6)
MCH RBC QN AUTO: 28.1 PG (ref 27–31)
MCHC RBC AUTO-ENTMCNC: 31.1 G/DL (ref 32–36)
MCV RBC AUTO: 90 FL (ref 82–98)
MONOCYTES # BLD AUTO: 0.5 K/UL (ref 0.3–1)
MONOCYTES NFR BLD: 8 % (ref 4–15)
NEUTROPHILS # BLD AUTO: 2.8 K/UL (ref 1.8–7.7)
NEUTROPHILS NFR BLD: 43.5 % (ref 38–73)
NONHDLC SERPL-MCNC: 211 MG/DL
NRBC BLD-RTO: 0 /100 WBC
PLATELET # BLD AUTO: 237 K/UL (ref 150–350)
PMV BLD AUTO: 8.9 FL (ref 9.2–12.9)
POTASSIUM SERPL-SCNC: 3.9 MMOL/L (ref 3.5–5.1)
PROT SERPL-MCNC: 7.1 G/DL (ref 6–8.4)
RBC # BLD AUTO: 4.27 M/UL (ref 4–5.4)
SODIUM SERPL-SCNC: 142 MMOL/L (ref 136–145)
TRIGL SERPL-MCNC: 338 MG/DL (ref 30–150)
TSH SERPL DL<=0.005 MIU/L-ACNC: 1.74 UIU/ML (ref 0.4–4)
WBC # BLD AUTO: 6.51 K/UL (ref 3.9–12.7)

## 2021-02-03 PROCEDURE — 83735 ASSAY OF MAGNESIUM: CPT

## 2021-02-03 PROCEDURE — 85025 COMPLETE CBC W/AUTO DIFF WBC: CPT

## 2021-02-03 PROCEDURE — 36415 COLL VENOUS BLD VENIPUNCTURE: CPT

## 2021-02-03 PROCEDURE — 82306 VITAMIN D 25 HYDROXY: CPT

## 2021-02-03 PROCEDURE — 84443 ASSAY THYROID STIM HORMONE: CPT

## 2021-02-03 PROCEDURE — 80061 LIPID PANEL: CPT

## 2021-02-03 PROCEDURE — 86703 HIV-1/HIV-2 1 RESULT ANTBDY: CPT

## 2021-02-03 PROCEDURE — 83036 HEMOGLOBIN GLYCOSYLATED A1C: CPT

## 2021-02-03 PROCEDURE — 80053 COMPREHEN METABOLIC PANEL: CPT

## 2021-02-03 PROCEDURE — 86803 HEPATITIS C AB TEST: CPT

## 2021-02-04 ENCOUNTER — HOSPITAL ENCOUNTER (OUTPATIENT)
Dept: RADIOLOGY | Facility: HOSPITAL | Age: 61
Discharge: HOME | End: 2021-02-04
Attending: INTERNAL MEDICINE
Payer: COMMERCIAL

## 2021-02-04 ENCOUNTER — HOSPITAL ENCOUNTER (OUTPATIENT)
Dept: CARDIOLOGY | Facility: HOSPITAL | Age: 61
Discharge: HOME | End: 2021-02-04
Attending: INTERNAL MEDICINE
Payer: COMMERCIAL

## 2021-02-04 DIAGNOSIS — R60.9 EDEMA, UNSPECIFIED TYPE: ICD-10-CM

## 2021-02-04 DIAGNOSIS — I83.893 VARICOSE VEINS OF BOTH LEGS WITH EDEMA: ICD-10-CM

## 2021-02-04 DIAGNOSIS — R06.02 SOB (SHORTNESS OF BREATH) ON EXERTION: ICD-10-CM

## 2021-02-04 DIAGNOSIS — I87.2 VENOUS INSUFFICIENCY OF BOTH LOWER EXTREMITIES: ICD-10-CM

## 2021-02-04 DIAGNOSIS — M79.606 PAIN OF LOWER EXTREMITY, UNSPECIFIED LATERALITY: ICD-10-CM

## 2021-02-04 LAB
25(OH)D3+25(OH)D2 SERPL-MCNC: 35 NG/ML (ref 30–96)
AORTIC ROOT ANNULUS: 2.36 CM
AORTIC VALVE CUSP SEPERATION: 1.53 CM
AV INDEX (PROSTH): 0.69
AV MEAN GRADIENT: 8 MMHG
AV PEAK GRADIENT: 15 MMHG
AV VALVE AREA: 2.52 CM2
AV VELOCITY RATIO: 0.79
CV ECHO LV RWT: 0.49 CM
DOP CALC AO PEAK VEL: 1.91 M/S
DOP CALC AO VTI: 36.65 CM
DOP CALC LVOT AREA: 3.7 CM2
DOP CALC LVOT DIAMETER: 2.16 CM
DOP CALC LVOT PEAK VEL: 1.5 M/S
DOP CALC LVOT STROKE VOLUME: 92.44 CM3
DOP CALCLVOT PEAK VEL VTI: 25.24 CM
E WAVE DECELERATION TIME: 219.94 MSEC
E/A RATIO: 0.88
ECHO LV POSTERIOR WALL: 0.95 CM (ref 0.6–1.1)
ESTIMATED AVG GLUCOSE: 114 MG/DL (ref 68–131)
FRACTIONAL SHORTENING: 33 % (ref 28–44)
HBA1C MFR BLD: 5.6 % (ref 4–5.6)
HCV AB SERPL QL IA: NEGATIVE
HIV 1+2 AB+HIV1 P24 AG SERPL QL IA: NEGATIVE
INTERVENTRICULAR SEPTUM: 1.04 CM (ref 0.6–1.1)
IVRT: 73.82 MSEC
LA MAJOR: 5.2 CM
LA MINOR: 4.57 CM
LA WIDTH: 3.28 CM
LEFT ATRIUM SIZE: 3.51 CM
LEFT ATRIUM VOLUME: 47.61 CM3
LEFT INTERNAL DIMENSION IN SYSTOLE: 2.59 CM (ref 2.1–4)
LEFT VENTRICLE DIASTOLIC VOLUME: 64.33 ML
LEFT VENTRICLE SYSTOLIC VOLUME: 24.36 ML
LEFT VENTRICULAR INTERNAL DIMENSION IN DIASTOLE: 3.86 CM (ref 3.5–6)
LEFT VENTRICULAR MASS: 119.32 G
MV PEAK A VEL: 0.86 M/S
MV PEAK E VEL: 0.76 M/S
PISA TR MAX VEL: 2.52 M/S
PV PEAK VELOCITY: 1.34 CM/S
RA MAJOR: 3.98 CM
RA PRESSURE: 3 MMHG
RA WIDTH: 3.05 CM
RIGHT VENTRICULAR END-DIASTOLIC DIMENSION: 2.67 CM
RV TISSUE DOPPLER FREE WALL SYSTOLIC VELOCITY 1 (APICAL 4 CHAMBER VIEW): 10.75 CM/S
SINUS: 2.63 CM
STJ: 1.92 CM
TR MAX PG: 25 MMHG
TRICUSPID ANNULAR PLANE SYSTOLIC EXCURSION: 2.42 CM
TV REST PULMONARY ARTERY PRESSURE: 28 MMHG

## 2021-02-04 PROCEDURE — 93970 US LOWER EXTREMITY VEINS BILATERAL INSUFFICIENCY: ICD-10-PCS | Mod: 26,,, | Performed by: RADIOLOGY

## 2021-02-04 PROCEDURE — 93306 ECHO (CUPID ONLY): ICD-10-PCS | Mod: 26,,, | Performed by: INTERNAL MEDICINE

## 2021-02-04 PROCEDURE — 93970 EXTREMITY STUDY: CPT | Mod: 26,,, | Performed by: RADIOLOGY

## 2021-02-04 PROCEDURE — 93306 TTE W/DOPPLER COMPLETE: CPT | Mod: 26,,, | Performed by: INTERNAL MEDICINE

## 2021-02-04 PROCEDURE — 93970 EXTREMITY STUDY: CPT | Mod: TC

## 2021-02-04 PROCEDURE — 93306 TTE W/DOPPLER COMPLETE: CPT

## 2021-02-07 ENCOUNTER — PATIENT MESSAGE (OUTPATIENT)
Dept: FAMILY MEDICINE | Facility: CLINIC | Age: 61
End: 2021-02-07

## 2021-02-18 ENCOUNTER — PATIENT OUTREACH (OUTPATIENT)
Dept: ADMINISTRATIVE | Facility: OTHER | Age: 61
End: 2021-02-18

## 2021-02-22 ENCOUNTER — OFFICE VISIT (OUTPATIENT)
Dept: CARDIOLOGY | Facility: CLINIC | Age: 61
End: 2021-02-22
Payer: COMMERCIAL

## 2021-02-22 ENCOUNTER — TELEPHONE (OUTPATIENT)
Dept: CARDIOLOGY | Facility: CLINIC | Age: 61
End: 2021-02-22

## 2021-02-22 VITALS
SYSTOLIC BLOOD PRESSURE: 116 MMHG | DIASTOLIC BLOOD PRESSURE: 84 MMHG | HEIGHT: 63 IN | WEIGHT: 158 LBS | HEART RATE: 90 BPM | OXYGEN SATURATION: 100 % | BODY MASS INDEX: 28 KG/M2

## 2021-02-22 DIAGNOSIS — I87.2 VENOUS INSUFFICIENCY OF BOTH LOWER EXTREMITIES: ICD-10-CM

## 2021-02-22 DIAGNOSIS — E78.2 MIXED HYPERLIPIDEMIA: Primary | ICD-10-CM

## 2021-02-22 DIAGNOSIS — Z82.49 FAMILY HISTORY OF ISCHEMIC HEART DISEASE (IHD): ICD-10-CM

## 2021-02-22 DIAGNOSIS — I83.893 VARICOSE VEINS OF BOTH LEGS WITH EDEMA: ICD-10-CM

## 2021-02-22 PROCEDURE — 93010 EKG 12-LEAD: ICD-10-PCS | Mod: S$GLB,,, | Performed by: INTERNAL MEDICINE

## 2021-02-22 PROCEDURE — 93005 ELECTROCARDIOGRAM TRACING: CPT | Mod: S$GLB,,, | Performed by: INTERNAL MEDICINE

## 2021-02-22 PROCEDURE — 1126F PR PAIN SEVERITY QUANTIFIED, NO PAIN PRESENT: ICD-10-PCS | Mod: S$GLB,,, | Performed by: INTERNAL MEDICINE

## 2021-02-22 PROCEDURE — 99999 PR PBB SHADOW E&M-EST. PATIENT-LVL III: CPT | Mod: PBBFAC,,, | Performed by: INTERNAL MEDICINE

## 2021-02-22 PROCEDURE — 99215 PR OFFICE/OUTPT VISIT, EST, LEVL V, 40-54 MIN: ICD-10-PCS | Mod: S$GLB,,, | Performed by: INTERNAL MEDICINE

## 2021-02-22 PROCEDURE — 99215 OFFICE O/P EST HI 40 MIN: CPT | Mod: S$GLB,,, | Performed by: INTERNAL MEDICINE

## 2021-02-22 PROCEDURE — 93010 ELECTROCARDIOGRAM REPORT: CPT | Mod: S$GLB,,, | Performed by: INTERNAL MEDICINE

## 2021-02-22 PROCEDURE — 93005 EKG 12-LEAD: ICD-10-PCS | Mod: S$GLB,,, | Performed by: INTERNAL MEDICINE

## 2021-02-22 PROCEDURE — 99999 PR PBB SHADOW E&M-EST. PATIENT-LVL III: ICD-10-PCS | Mod: PBBFAC,,, | Performed by: INTERNAL MEDICINE

## 2021-02-22 PROCEDURE — 3008F BODY MASS INDEX DOCD: CPT | Mod: CPTII,S$GLB,, | Performed by: INTERNAL MEDICINE

## 2021-02-22 PROCEDURE — 3008F PR BODY MASS INDEX (BMI) DOCUMENTED: ICD-10-PCS | Mod: CPTII,S$GLB,, | Performed by: INTERNAL MEDICINE

## 2021-02-22 PROCEDURE — 1126F AMNT PAIN NOTED NONE PRSNT: CPT | Mod: S$GLB,,, | Performed by: INTERNAL MEDICINE

## 2021-02-22 RX ORDER — ROSUVASTATIN CALCIUM 40 MG/1
40 TABLET, COATED ORAL NIGHTLY
Qty: 90 TABLET | Refills: 3 | Status: SHIPPED | OUTPATIENT
Start: 2021-02-22 | End: 2021-06-14 | Stop reason: SDUPTHER

## 2021-03-20 ENCOUNTER — IMMUNIZATION (OUTPATIENT)
Dept: PRIMARY CARE CLINIC | Facility: CLINIC | Age: 61
End: 2021-03-20
Payer: COMMERCIAL

## 2021-03-20 DIAGNOSIS — Z23 NEED FOR VACCINATION: Primary | ICD-10-CM

## 2021-03-20 PROCEDURE — 91300 PR SARS-COV- 2 COVID-19 VACCINE, NO PRSV, 30MCG/0.3ML, IM: ICD-10-PCS | Mod: S$GLB,,, | Performed by: INTERNAL MEDICINE

## 2021-03-20 PROCEDURE — 91300 PR SARS-COV- 2 COVID-19 VACCINE, NO PRSV, 30MCG/0.3ML, IM: CPT | Mod: S$GLB,,, | Performed by: INTERNAL MEDICINE

## 2021-03-20 PROCEDURE — 0001A PR IMMUNIZ ADMIN, SARS-COV-2 COVID-19 VACC, 30MCG/0.3ML, 1ST DOSE: ICD-10-PCS | Mod: CV19,S$GLB,, | Performed by: INTERNAL MEDICINE

## 2021-03-20 PROCEDURE — 0001A PR IMMUNIZ ADMIN, SARS-COV-2 COVID-19 VACC, 30MCG/0.3ML, 1ST DOSE: CPT | Mod: CV19,S$GLB,, | Performed by: INTERNAL MEDICINE

## 2021-03-20 RX ADMIN — Medication 0.3 ML: at 09:03

## 2021-04-01 ENCOUNTER — PATIENT MESSAGE (OUTPATIENT)
Dept: ADMINISTRATIVE | Facility: HOSPITAL | Age: 61
End: 2021-04-01

## 2021-04-11 ENCOUNTER — IMMUNIZATION (OUTPATIENT)
Dept: PRIMARY CARE CLINIC | Facility: CLINIC | Age: 61
End: 2021-04-11
Payer: COMMERCIAL

## 2021-04-11 DIAGNOSIS — Z23 NEED FOR VACCINATION: Primary | ICD-10-CM

## 2021-04-11 PROCEDURE — 0002A PR IMMUNIZ ADMIN, SARS-COV-2 COVID-19 VACC, 30MCG/0.3ML, 2ND DOSE: CPT | Mod: CV19,S$GLB,, | Performed by: INTERNAL MEDICINE

## 2021-04-11 PROCEDURE — 91300 PR SARS-COV- 2 COVID-19 VACCINE, NO PRSV, 30MCG/0.3ML, IM: ICD-10-PCS | Mod: S$GLB,,, | Performed by: INTERNAL MEDICINE

## 2021-04-11 PROCEDURE — 91300 PR SARS-COV- 2 COVID-19 VACCINE, NO PRSV, 30MCG/0.3ML, IM: CPT | Mod: S$GLB,,, | Performed by: INTERNAL MEDICINE

## 2021-04-11 PROCEDURE — 0002A PR IMMUNIZ ADMIN, SARS-COV-2 COVID-19 VACC, 30MCG/0.3ML, 2ND DOSE: ICD-10-PCS | Mod: CV19,S$GLB,, | Performed by: INTERNAL MEDICINE

## 2021-04-11 RX ADMIN — Medication 0.3 ML: at 08:04

## 2021-06-15 RX ORDER — ROSUVASTATIN CALCIUM 40 MG/1
40 TABLET, COATED ORAL NIGHTLY
Qty: 90 TABLET | Refills: 3 | Status: SHIPPED | OUTPATIENT
Start: 2021-06-15 | End: 2022-07-13

## 2021-06-17 ENCOUNTER — OFFICE VISIT (OUTPATIENT)
Dept: OBSTETRICS AND GYNECOLOGY | Facility: CLINIC | Age: 61
End: 2021-06-17
Payer: COMMERCIAL

## 2021-06-17 VITALS — WEIGHT: 160.5 LBS | DIASTOLIC BLOOD PRESSURE: 72 MMHG | BODY MASS INDEX: 28.43 KG/M2 | SYSTOLIC BLOOD PRESSURE: 138 MMHG

## 2021-06-17 DIAGNOSIS — Z01.419 ENCOUNTER FOR GYNECOLOGICAL EXAMINATION WITHOUT ABNORMAL FINDING: Primary | ICD-10-CM

## 2021-06-17 DIAGNOSIS — Z12.39 SCREENING BREAST EXAMINATION: ICD-10-CM

## 2021-06-17 DIAGNOSIS — N95.2 ATROPHIC VAGINITIS: ICD-10-CM

## 2021-06-17 PROCEDURE — 88175 CYTOPATH C/V AUTO FLUID REDO: CPT | Performed by: OBSTETRICS & GYNECOLOGY

## 2021-06-17 PROCEDURE — 3008F BODY MASS INDEX DOCD: CPT | Mod: CPTII,S$GLB,, | Performed by: OBSTETRICS & GYNECOLOGY

## 2021-06-17 PROCEDURE — 3008F PR BODY MASS INDEX (BMI) DOCUMENTED: ICD-10-PCS | Mod: CPTII,S$GLB,, | Performed by: OBSTETRICS & GYNECOLOGY

## 2021-06-17 PROCEDURE — 99999 PR PBB SHADOW E&M-EST. PATIENT-LVL III: CPT | Mod: PBBFAC,,, | Performed by: OBSTETRICS & GYNECOLOGY

## 2021-06-17 PROCEDURE — 99386 PR PREVENTIVE VISIT,NEW,40-64: ICD-10-PCS | Mod: S$GLB,,, | Performed by: OBSTETRICS & GYNECOLOGY

## 2021-06-17 PROCEDURE — 99386 PREV VISIT NEW AGE 40-64: CPT | Mod: S$GLB,,, | Performed by: OBSTETRICS & GYNECOLOGY

## 2021-06-17 PROCEDURE — 87624 HPV HI-RISK TYP POOLED RSLT: CPT | Performed by: OBSTETRICS & GYNECOLOGY

## 2021-06-17 PROCEDURE — 99999 PR PBB SHADOW E&M-EST. PATIENT-LVL III: ICD-10-PCS | Mod: PBBFAC,,, | Performed by: OBSTETRICS & GYNECOLOGY

## 2021-06-17 RX ORDER — TRIAMCINOLONE ACETONIDE 0.25 MG/G
CREAM TOPICAL 2 TIMES DAILY
Qty: 80 G | Refills: 1 | Status: SHIPPED | OUTPATIENT
Start: 2021-06-17 | End: 2022-04-19

## 2021-06-24 LAB
FINAL PATHOLOGIC DIAGNOSIS: NORMAL
Lab: NORMAL

## 2021-06-25 ENCOUNTER — PATIENT MESSAGE (OUTPATIENT)
Dept: OBSTETRICS AND GYNECOLOGY | Facility: CLINIC | Age: 61
End: 2021-06-25

## 2021-06-25 LAB
HPV HR 12 DNA SPEC QL NAA+PROBE: NEGATIVE
HPV16 AG SPEC QL: NEGATIVE
HPV18 DNA SPEC QL NAA+PROBE: NEGATIVE

## 2021-07-26 RX ORDER — OMEPRAZOLE 40 MG/1
40 CAPSULE, DELAYED RELEASE ORAL DAILY
Qty: 90 CAPSULE | Refills: 1 | Status: SHIPPED | OUTPATIENT
Start: 2021-07-26 | End: 2022-02-08

## 2021-10-05 ENCOUNTER — PATIENT MESSAGE (OUTPATIENT)
Dept: ADMINISTRATIVE | Facility: HOSPITAL | Age: 61
End: 2021-10-05

## 2021-12-14 ENCOUNTER — OFFICE VISIT (OUTPATIENT)
Dept: URGENT CARE | Facility: CLINIC | Age: 61
End: 2021-12-14
Payer: COMMERCIAL

## 2021-12-14 ENCOUNTER — NURSE TRIAGE (OUTPATIENT)
Dept: ADMINISTRATIVE | Facility: CLINIC | Age: 61
End: 2021-12-14
Payer: COMMERCIAL

## 2021-12-14 VITALS
DIASTOLIC BLOOD PRESSURE: 90 MMHG | HEIGHT: 63 IN | OXYGEN SATURATION: 98 % | SYSTOLIC BLOOD PRESSURE: 144 MMHG | RESPIRATION RATE: 15 BRPM | HEART RATE: 84 BPM | TEMPERATURE: 98 F | BODY MASS INDEX: 28.35 KG/M2 | WEIGHT: 160 LBS

## 2021-12-14 DIAGNOSIS — R53.83 FATIGUE, UNSPECIFIED TYPE: ICD-10-CM

## 2021-12-14 DIAGNOSIS — B34.9 VIRAL SYNDROME: Primary | ICD-10-CM

## 2021-12-14 LAB
CTP QC/QA: YES
CTP QC/QA: YES
POC MOLECULAR INFLUENZA A AGN: NEGATIVE
POC MOLECULAR INFLUENZA B AGN: NEGATIVE
SARS-COV-2 RDRP RESP QL NAA+PROBE: NEGATIVE

## 2021-12-14 PROCEDURE — 87502 POCT INFLUENZA A/B MOLECULAR: ICD-10-PCS | Mod: QW,S$GLB,, | Performed by: PHYSICIAN ASSISTANT

## 2021-12-14 PROCEDURE — 99213 PR OFFICE/OUTPT VISIT, EST, LEVL III, 20-29 MIN: ICD-10-PCS | Mod: S$GLB,CS,, | Performed by: PHYSICIAN ASSISTANT

## 2021-12-14 PROCEDURE — U0002: ICD-10-PCS | Mod: QW,S$GLB,, | Performed by: PHYSICIAN ASSISTANT

## 2021-12-14 PROCEDURE — 87502 INFLUENZA DNA AMP PROBE: CPT | Mod: QW,S$GLB,, | Performed by: PHYSICIAN ASSISTANT

## 2021-12-14 PROCEDURE — 99213 OFFICE O/P EST LOW 20 MIN: CPT | Mod: S$GLB,CS,, | Performed by: PHYSICIAN ASSISTANT

## 2021-12-14 PROCEDURE — U0002 COVID-19 LAB TEST NON-CDC: HCPCS | Mod: QW,S$GLB,, | Performed by: PHYSICIAN ASSISTANT

## 2021-12-17 ENCOUNTER — OFFICE VISIT (OUTPATIENT)
Dept: URGENT CARE | Facility: CLINIC | Age: 61
End: 2021-12-17
Payer: COMMERCIAL

## 2021-12-17 VITALS
TEMPERATURE: 99 F | BODY MASS INDEX: 28.35 KG/M2 | DIASTOLIC BLOOD PRESSURE: 80 MMHG | HEIGHT: 63 IN | OXYGEN SATURATION: 99 % | WEIGHT: 160 LBS | SYSTOLIC BLOOD PRESSURE: 147 MMHG | HEART RATE: 94 BPM

## 2021-12-17 DIAGNOSIS — B02.39 SHINGLES OF EYELID: ICD-10-CM

## 2021-12-17 DIAGNOSIS — B02.8 HERPES ZOSTER WITH COMPLICATION: Primary | ICD-10-CM

## 2021-12-17 PROCEDURE — 99213 PR OFFICE/OUTPT VISIT, EST, LEVL III, 20-29 MIN: ICD-10-PCS | Mod: S$GLB,,, | Performed by: NURSE PRACTITIONER

## 2021-12-17 PROCEDURE — 99213 OFFICE O/P EST LOW 20 MIN: CPT | Mod: S$GLB,,, | Performed by: NURSE PRACTITIONER

## 2021-12-17 RX ORDER — VALACYCLOVIR HYDROCHLORIDE 1 G/1
1000 TABLET, FILM COATED ORAL 3 TIMES DAILY
Qty: 21 TABLET | Refills: 0 | Status: SHIPPED | OUTPATIENT
Start: 2021-12-17 | End: 2022-04-19

## 2021-12-17 RX ORDER — VALACYCLOVIR HYDROCHLORIDE 1 G/1
1000 TABLET, FILM COATED ORAL 3 TIMES DAILY
Qty: 21 TABLET | Refills: 0 | Status: SHIPPED | OUTPATIENT
Start: 2021-12-17 | End: 2021-12-17

## 2022-02-02 ENCOUNTER — PATIENT OUTREACH (OUTPATIENT)
Dept: ADMINISTRATIVE | Facility: HOSPITAL | Age: 62
End: 2022-02-02
Payer: COMMERCIAL

## 2022-03-28 ENCOUNTER — PATIENT MESSAGE (OUTPATIENT)
Dept: PRIMARY CARE CLINIC | Facility: CLINIC | Age: 62
End: 2022-03-28
Payer: COMMERCIAL

## 2022-04-19 ENCOUNTER — OFFICE VISIT (OUTPATIENT)
Dept: PRIMARY CARE CLINIC | Facility: CLINIC | Age: 62
End: 2022-04-19
Payer: COMMERCIAL

## 2022-04-19 VITALS
TEMPERATURE: 98 F | OXYGEN SATURATION: 100 % | WEIGHT: 158.81 LBS | HEART RATE: 86 BPM | BODY MASS INDEX: 28.14 KG/M2 | SYSTOLIC BLOOD PRESSURE: 136 MMHG | DIASTOLIC BLOOD PRESSURE: 77 MMHG | HEIGHT: 63 IN

## 2022-04-19 DIAGNOSIS — Z00.00 ANNUAL PHYSICAL EXAM: Primary | ICD-10-CM

## 2022-04-19 DIAGNOSIS — Z12.31 SCREENING MAMMOGRAM FOR BREAST CANCER: ICD-10-CM

## 2022-04-19 DIAGNOSIS — I87.2 VENOUS INSUFFICIENCY OF BOTH LOWER EXTREMITIES: ICD-10-CM

## 2022-04-19 DIAGNOSIS — K21.9 GASTROESOPHAGEAL REFLUX DISEASE WITHOUT ESOPHAGITIS: ICD-10-CM

## 2022-04-19 DIAGNOSIS — R13.10 ODYNOPHAGIA: ICD-10-CM

## 2022-04-19 DIAGNOSIS — E78.2 MIXED HYPERLIPIDEMIA: ICD-10-CM

## 2022-04-19 DIAGNOSIS — R73.03 PRE-DIABETES: ICD-10-CM

## 2022-04-19 DIAGNOSIS — E66.3 OVERWEIGHT (BMI 25.0-29.9): ICD-10-CM

## 2022-04-19 DIAGNOSIS — M54.2 ANTERIOR NECK PAIN: ICD-10-CM

## 2022-04-19 DIAGNOSIS — J30.2 SEASONAL ALLERGIES: ICD-10-CM

## 2022-04-19 PROCEDURE — 3078F DIAST BP <80 MM HG: CPT | Mod: CPTII,S$GLB,, | Performed by: FAMILY MEDICINE

## 2022-04-19 PROCEDURE — 3075F PR MOST RECENT SYSTOLIC BLOOD PRESS GE 130-139MM HG: ICD-10-PCS | Mod: CPTII,S$GLB,, | Performed by: FAMILY MEDICINE

## 2022-04-19 PROCEDURE — 3078F PR MOST RECENT DIASTOLIC BLOOD PRESSURE < 80 MM HG: ICD-10-PCS | Mod: CPTII,S$GLB,, | Performed by: FAMILY MEDICINE

## 2022-04-19 PROCEDURE — 3008F BODY MASS INDEX DOCD: CPT | Mod: CPTII,S$GLB,, | Performed by: FAMILY MEDICINE

## 2022-04-19 PROCEDURE — 99999 PR PBB SHADOW E&M-EST. PATIENT-LVL IV: CPT | Mod: PBBFAC,,, | Performed by: FAMILY MEDICINE

## 2022-04-19 PROCEDURE — 3075F SYST BP GE 130 - 139MM HG: CPT | Mod: CPTII,S$GLB,, | Performed by: FAMILY MEDICINE

## 2022-04-19 PROCEDURE — 99396 PR PREVENTIVE VISIT,EST,40-64: ICD-10-PCS | Mod: S$GLB,,, | Performed by: FAMILY MEDICINE

## 2022-04-19 PROCEDURE — 3008F PR BODY MASS INDEX (BMI) DOCUMENTED: ICD-10-PCS | Mod: CPTII,S$GLB,, | Performed by: FAMILY MEDICINE

## 2022-04-19 PROCEDURE — 99999 PR PBB SHADOW E&M-EST. PATIENT-LVL IV: ICD-10-PCS | Mod: PBBFAC,,, | Performed by: FAMILY MEDICINE

## 2022-04-19 PROCEDURE — 99396 PREV VISIT EST AGE 40-64: CPT | Mod: S$GLB,,, | Performed by: FAMILY MEDICINE

## 2022-04-19 PROCEDURE — 1159F MED LIST DOCD IN RCRD: CPT | Mod: CPTII,S$GLB,, | Performed by: FAMILY MEDICINE

## 2022-04-19 PROCEDURE — 1159F PR MEDICATION LIST DOCUMENTED IN MEDICAL RECORD: ICD-10-PCS | Mod: CPTII,S$GLB,, | Performed by: FAMILY MEDICINE

## 2022-04-19 RX ORDER — AMOXICILLIN 500 MG/1
500 CAPSULE ORAL EVERY 8 HOURS
COMMUNITY
Start: 2022-02-16 | End: 2022-04-19

## 2022-04-19 RX ORDER — HYDROCODONE BITARTRATE AND ACETAMINOPHEN 10; 325 MG/1; MG/1
1 TABLET ORAL EVERY 6 HOURS PRN
COMMUNITY
Start: 2022-02-16 | End: 2023-02-02

## 2022-04-19 RX ORDER — CARBAMAZEPINE 200 MG/1
200 TABLET, EXTENDED RELEASE ORAL 2 TIMES DAILY
COMMUNITY
Start: 2022-03-15 | End: 2024-01-05

## 2022-04-19 NOTE — PROGRESS NOTES
"Subjective:       Patient ID: Nicole Perez is a 61 y.o. female.    Chief Complaint: Annual Exam    62 yo F pt, established pt of mine, with PMH significnant for HLD, GERD, Prediabetes, Seasonal Allergies, muscle spasms, and venous insufficiency.  She presents for annual exam.      She presents with anterior neck pain and pain with swallowing x 5-6 weeks. Pain is mostly noticeable with swallowing. Reports this does not feel like a sore throat. Reports she is experiencing vinod pain. States she had issues with choking in past--normal MBSS 2017; Had ENT evaluation 6/2019 and dx'd with LPR. Choking resolved with omeprazole 40 mg daily.       BP: 136/77  Body mass index is 28.14 kg/m².  Diet/Lifestyle: Reports poor diet; lots of starches; lots of crawfish. Cutting down on sodas; weekends only. Having lots of ice cream--not daily. Has limited fruits/vegetables. Works for home. Not exercising. Buying fast foods. Lonely in home as  working in [place].   No LMP recorded. Patient has had a hysterectomy 2013  Last Pap: Not indicated  Last Mammogram: 3/2019 BIRADs Cat 1 neg  Last Colonoscopy: 4/2019; 2 mm polyp with sigmoid colon; benign pathology. Advised f/u in 5 years  Last Flu Vaccine: N/A  Last Tdap Vaccine: Had not had. Declines  Last Shingrix: Has not had. Anticipates having done [facility]  COVID-19: Completed primary series; not booster      Past Medical History:   Diagnosis Date    Abdominal bloating 02/03/2017    Abnormal Pap smear of cervix     Elevated liver function tests 02/21/2017    Foot swelling 02/03/2017    Bilateral    GERD (gastroesophageal reflux disease)     History of colon polyps     History of Helicobacter pylori infection     History of hematuria 02/21/2017    Hypercholesteremia 02/21/2017    Lactose intolerance 02/03/2017    Prediabetes 02/21/2017    Spasm of muscle     Diffuse muscle spasms "for many years." On meds for this.       Patient Active Problem List   Diagnosis    Varicose " veins of both legs with edema    Pain of lower extremity    History of smoking    Mixed hyperlipidemia    Pre-diabetes    Muscle spasms of both lower extremities    Venous insufficiency of both lower extremities    Screening for malignant neoplasm of colon    Overweight (BMI 25.0-29.9)    Gastroesophageal reflux disease without esophagitis    Family history of ischemic heart disease (IHD)       Past Surgical History:   Procedure Laterality Date    breast reduction Bilateral      SECTION      CHOLECYSTECTOMY      COLONOSCOPY N/A 2019    Procedure: COLONOSCOPY/Golytely;  Surgeon: Angela Staples MD;  [facility];  Service: Endoscopy;  Laterality: N/A;    COLONOSCOPY W/ POLYPECTOMY      HYSTERECTOMY      TOTAL ABDOMINAL HYSTERECTOMY W/ BILATERAL SALPINGOOPHORECTOMY      TOTAL REDUCTION MAMMOPLASTY      tummy tuck Bilateral        Family History   Problem Relation Age of Onset    Heart disease Father     Hypertension Father     Heart disease Mother     Hypertension Mother        Social History     Tobacco Use   Smoking Status Former Smoker    Packs/day: 0.50    Years: 25.00    Pack years: 12.50    Quit date: 2008    Years since quittin.1   Smokeless Tobacco Never Used       Social History     Social History Narrative    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     Review of patient's allergies indicates:  No Known Allergies     Review of Systems   Constitutional: Negative for activity change, appetite change, chills, fever and unexpected weight change.   HENT: Positive for trouble swallowing (pain). Negative for congestion, postnasal drip, sinus pressure, sinus pain, sore throat and voice change.    Eyes: Negative for redness, itching and visual disturbance.   Respiratory: Negative for cough, choking, chest tightness, shortness of breath and wheezing.    Cardiovascular: Negative for chest pain  "and palpitations.   Gastrointestinal: Negative for abdominal pain, constipation, diarrhea, nausea and vomiting.   Genitourinary: Negative for dysuria, frequency, hematuria, urgency, vaginal bleeding and vaginal discharge.   Musculoskeletal: Negative for back pain, gait problem, myalgias and neck pain.   Skin: Negative for rash.   Neurological: Negative for dizziness, syncope and headaches.   Psychiatric/Behavioral: Negative for dysphoric mood and suicidal ideas. The patient is not nervous/anxious.          Objective:        /77 (BP Location: Left arm, Patient Position: Sitting, BP Method: Medium (Automatic))   Pulse 86   Temp 98.3 °F (36.8 °C) (Oral)   Ht 5' 3" (1.6 m)   Wt 72 kg (158 lb 13.5 oz)   SpO2 100%   BMI 28.14 kg/m²      Physical Exam  Constitutional:       General: She is not in acute distress.     Appearance: She is well-developed.   HENT:      Head: Normocephalic and atraumatic.      Right Ear: Tympanic membrane, ear canal and external ear normal.      Left Ear: Tympanic membrane, ear canal and external ear normal.      Mouth/Throat:      Tonsils: No tonsillar exudate. 1+ on the right. 1+ on the left.      Comments: ?cobblestoning  Eyes:      General: No scleral icterus.     Extraocular Movements: Extraocular movements intact.      Conjunctiva/sclera: Conjunctivae normal.   Neck:      Thyroid: No thyromegaly.   Cardiovascular:      Rate and Rhythm: Normal rate and regular rhythm.      Heart sounds: Normal heart sounds. No murmur heard.    No friction rub. No gallop.   Pulmonary:      Effort: Pulmonary effort is normal.      Breath sounds: Normal breath sounds. No wheezing or rales.   Abdominal:      General: Bowel sounds are normal. There is no distension.      Palpations: Abdomen is soft. There is no mass.      Tenderness: There is no abdominal tenderness.   Musculoskeletal:         General: Normal range of motion.      Cervical back: Normal range of motion and neck supple. No tenderness. "      Right lower leg: Edema (non-pitting edema) present.      Left lower leg: Edema (non-pitting edema) present.   Lymphadenopathy:      Cervical: No cervical adenopathy.   Skin:     General: Skin is warm and dry.      Findings: No erythema or rash.   Neurological:      Mental Status: She is alert and oriented to person, place, and time.      Cranial Nerves: No cranial nerve deficit.   Psychiatric:         Behavior: Behavior normal.           Assessment:       1. Annual physical exam    2. Anterior neck pain    3. Odynophagia    4. Overweight (BMI 25.0-29.9)    5. Mixed hyperlipidemia    6. Gastroesophageal reflux disease without esophagitis    7. Pre-diabetes    8. Seasonal allergies    9. Venous insufficiency of both lower extremities    10. Screening mammogram for breast cancer        Plan:       Nicole was seen today for annual exam.    Diagnoses and all orders for this visit:    Annual physical exam  -     CBC Auto Differential; Future  -     Comprehensive Metabolic Panel; Future  -     Hemoglobin A1C; Future  -     Lipid Panel; Future  -     CBC Auto Differential  -     Comprehensive Metabolic Panel  -     Hemoglobin A1C  -     Lipid Panel    Anterior neck pain  -     CT Soft Tissue Neck With Contrast; Future  -     TSH; Future  -     TSH    Odynophagia  -     CT Soft Tissue Neck With Contrast; Future    Overweight (BMI 25.0-29.9)    Mixed hyperlipidemia    Gastroesophageal reflux disease without esophagitis    Pre-diabetes    Seasonal allergies    Venous insufficiency of both lower extremities    Screening mammogram for breast cancer  -     Mammo Digital Screening Bilat w/ Larry; Future      Annual Exam  BP in pre-hypertensive range  Body mass index is 28.14 kg/m².--see below  Advised eye exams q1-2 years  Advised dental exams q6 months  Pap not indicated as she is s/p hysterectomy 2013  Mammogram: 3/2019 BIRADs Cat 1 neg. Referral placed  Last Colonoscopy: 4/2019; 2 mm polyp with sigmoid colon (benign  path)--advised f/u in 5 years  Tdap vaccine declined  Rx'd Shingrix to pharmacy   COVID-19 booster recommended  HIV and Hep C screens neg 2/3/2021  RTC for fasting labs    Anterior neck pain; odynophagia  Exam without abnormal findings  Will send for TSH and  CT soft tissue of neck for further evaluation   If CT neg will refer to GI for possible esophagram vs EGD    Overweight   Body mass index is 28.14 kg/m².  Advised at least 30 min of CV exercise 5 days a week. Encouraged plant-based diet. Advised small/frequent meals.  Also advised avoidance of sweetened beverages, limit portion sizes, and discussed healthy carbohydrate options (brown rice, 100% whole wheat bread, wheat pasta)      HLD  , , HDL 40, .4  2/3/2021  RTC for FLP  Now adherent with rosuvastatin 40 mg hs     GERD/LPR  Continue omeprazole 40 mg daily    Prediabetes  A1c 5.6 2/3/2021  RTC for A1c    Seasonal Allergies  Previously rx'd Flonase/Allegra   Currently stable without meds    Venous Insufficiency  --Currently being followed by cardiology. Last visit 2/22/2021   --Previously advised  Exercise  Weight loss  Compression stockings 20-30 mmHg  Dietary changes  Consideingr EVLT for superficial disease if there is no improvement with conservative measures. If there is no improvement after management superficial venous reflux disease, she will proceed with venogram. US in 2/2017 revealed bilateral GSV + deep venous reflux    Muscle Spasms  --Occurring on daily basis x 10+ years. Pt has been sedentary for many years due to work from home  --Followed by outside Neurology (Dr. Braden Skinner and treated with carbamazepine 200 mg q12    F/U plan pending laboratory test results

## 2022-04-21 ENCOUNTER — HOSPITAL ENCOUNTER (OUTPATIENT)
Dept: RADIOLOGY | Facility: HOSPITAL | Age: 62
Discharge: HOME | End: 2022-04-21
Attending: FAMILY MEDICINE
Payer: COMMERCIAL

## 2022-04-21 ENCOUNTER — PATIENT MESSAGE (OUTPATIENT)
Dept: PRIMARY CARE CLINIC | Facility: CLINIC | Age: 62
End: 2022-04-21
Payer: COMMERCIAL

## 2022-04-21 ENCOUNTER — TELEPHONE (OUTPATIENT)
Dept: PRIMARY CARE CLINIC | Facility: CLINIC | Age: 62
End: 2022-04-21
Payer: COMMERCIAL

## 2022-04-21 DIAGNOSIS — M54.2 ANTERIOR NECK PAIN: ICD-10-CM

## 2022-04-21 DIAGNOSIS — R13.10 ODYNOPHAGIA: Primary | ICD-10-CM

## 2022-04-21 DIAGNOSIS — Z12.31 SCREENING MAMMOGRAM FOR BREAST CANCER: ICD-10-CM

## 2022-04-21 DIAGNOSIS — R13.10 ODYNOPHAGIA: ICD-10-CM

## 2022-04-21 PROCEDURE — 77063 BREAST TOMOSYNTHESIS BI: CPT | Mod: TC

## 2022-04-21 PROCEDURE — 77067 MAMMO DIGITAL SCREENING BILAT WITH TOMO: ICD-10-PCS | Mod: 26,,, | Performed by: RADIOLOGY

## 2022-04-21 PROCEDURE — 77067 SCR MAMMO BI INCL CAD: CPT | Mod: 26,,, | Performed by: RADIOLOGY

## 2022-04-21 PROCEDURE — 25500020 PHARM REV CODE 255: Performed by: FAMILY MEDICINE

## 2022-04-21 PROCEDURE — 70491 CT SOFT TISSUE NECK WITH CONTRAST: ICD-10-PCS | Mod: 26,,, | Performed by: RADIOLOGY

## 2022-04-21 PROCEDURE — 77063 MAMMO DIGITAL SCREENING BILAT WITH TOMO: ICD-10-PCS | Mod: 26,,, | Performed by: RADIOLOGY

## 2022-04-21 PROCEDURE — 70491 CT SOFT TISSUE NECK W/DYE: CPT | Mod: TC

## 2022-04-21 PROCEDURE — 70491 CT SOFT TISSUE NECK W/DYE: CPT | Mod: 26,,, | Performed by: RADIOLOGY

## 2022-04-21 PROCEDURE — 77063 BREAST TOMOSYNTHESIS BI: CPT | Mod: 26,,, | Performed by: RADIOLOGY

## 2022-04-21 RX ADMIN — IOHEXOL 75 ML: 350 INJECTION, SOLUTION INTRAVENOUS at 10:04

## 2022-04-21 NOTE — TELEPHONE ENCOUNTER
Calling to inform patient that her lab orders where sent to [facility]. Left VM for patient to give a call back

## 2022-04-21 NOTE — TELEPHONE ENCOUNTER
----- Message from Brigida Paz sent at 4/20/2022  5:22 PM CDT -----  Type:  Patient Returning Call    Who Called pt   Who Left Message for Patient: pt  Does the patient know what this is regarding?: pt need a call to see if he lab order went [facility]  Would the patient rather a call back or a response via [facility]?  call   Best Call Back Number: [phone]  Additional Information:  call

## 2022-04-24 LAB
ALBUMIN SERPL-MCNC: 4.3 G/DL (ref 3.6–5.1)
ALBUMIN/GLOB SERPL: 1.8 (CALC) (ref 1–2.5)
ALP SERPL-CCNC: 103 U/L (ref 37–153)
ALT SERPL-CCNC: 52 U/L (ref 6–29)
AST SERPL-CCNC: 31 U/L (ref 10–35)
BASOPHILS # BLD AUTO: 41 CELLS/UL (ref 0–200)
BASOPHILS NFR BLD AUTO: 0.7 %
BILIRUB SERPL-MCNC: 0.3 MG/DL (ref 0.2–1.2)
BUN SERPL-MCNC: 9 MG/DL (ref 7–25)
BUN/CREAT SERPL: ABNORMAL (CALC) (ref 6–22)
CALCIUM SERPL-MCNC: 9.6 MG/DL (ref 8.6–10.4)
CHLORIDE SERPL-SCNC: 107 MMOL/L (ref 98–110)
CHOLEST SERPL-MCNC: 151 MG/DL
CHOLEST/HDLC SERPL: 3.3 (CALC)
CO2 SERPL-SCNC: 27 MMOL/L (ref 20–32)
CREAT SERPL-MCNC: 0.67 MG/DL (ref 0.5–1.05)
EOSINOPHIL # BLD AUTO: 153 CELLS/UL (ref 15–500)
EOSINOPHIL NFR BLD AUTO: 2.6 %
ERYTHROCYTE [DISTWIDTH] IN BLOOD BY AUTOMATED COUNT: 11.8 % (ref 11–15)
GLOBULIN SER CALC-MCNC: 2.4 G/DL (CALC) (ref 1.9–3.7)
GLUCOSE SERPL-MCNC: 110 MG/DL (ref 65–99)
HBA1C MFR BLD: 5.8 % OF TOTAL HGB
HCT VFR BLD AUTO: 38.4 % (ref 35–45)
HDLC SERPL-MCNC: 46 MG/DL
HGB BLD-MCNC: 12.2 G/DL (ref 11.7–15.5)
LDLC SERPL CALC-MCNC: 72 MG/DL (CALC)
LYMPHOCYTES # BLD AUTO: 2791 CELLS/UL (ref 850–3900)
LYMPHOCYTES NFR BLD AUTO: 47.3 %
MCH RBC QN AUTO: 27.9 PG (ref 27–33)
MCHC RBC AUTO-ENTMCNC: 31.8 G/DL (ref 32–36)
MCV RBC AUTO: 87.7 FL (ref 80–100)
MONOCYTES # BLD AUTO: 484 CELLS/UL (ref 200–950)
MONOCYTES NFR BLD AUTO: 8.2 %
NEUTROPHILS # BLD AUTO: 2431 CELLS/UL (ref 1500–7800)
NEUTROPHILS NFR BLD AUTO: 41.2 %
NONHDLC SERPL-MCNC: 105 MG/DL (CALC)
PLATELET # BLD AUTO: 203 THOUSAND/UL (ref 140–400)
PMV BLD REES-ECKER: 9.2 FL (ref 7.5–12.5)
POTASSIUM SERPL-SCNC: 4.7 MMOL/L (ref 3.5–5.3)
PROT SERPL-MCNC: 6.7 G/DL (ref 6.1–8.1)
RBC # BLD AUTO: 4.38 MILLION/UL (ref 3.8–5.1)
SODIUM SERPL-SCNC: 141 MMOL/L (ref 135–146)
TRIGL SERPL-MCNC: 238 MG/DL
TSH SERPL-ACNC: 1.97 MIU/L (ref 0.4–4.5)
WBC # BLD AUTO: 5.9 THOUSAND/UL (ref 3.8–10.8)

## 2022-04-28 ENCOUNTER — PATIENT MESSAGE (OUTPATIENT)
Dept: PRIMARY CARE CLINIC | Facility: CLINIC | Age: 62
End: 2022-04-28
Payer: COMMERCIAL

## 2022-04-28 NOTE — TELEPHONE ENCOUNTER
The 10-year ASCVD risk score (Elena BENÍTEZ Jr., et al., 2013) is: 3%    Values used to calculate the score:      Age: 61 years      Sex: Female      Is Non- : No      Diabetic: No      Tobacco smoker: No      Systolic Blood Pressure: 136 mmHg      Is BP treated: No      HDL Cholesterol: 46 mg/dL      Total Cholesterol: 151 mg/dL

## 2022-05-31 ENCOUNTER — OFFICE VISIT (OUTPATIENT)
Dept: CARDIOLOGY | Facility: CLINIC | Age: 62
End: 2022-05-31
Payer: COMMERCIAL

## 2022-05-31 ENCOUNTER — TELEPHONE (OUTPATIENT)
Dept: CARDIOLOGY | Facility: CLINIC | Age: 62
End: 2022-05-31
Payer: COMMERCIAL

## 2022-05-31 VITALS
HEART RATE: 84 BPM | DIASTOLIC BLOOD PRESSURE: 77 MMHG | OXYGEN SATURATION: 96 % | SYSTOLIC BLOOD PRESSURE: 115 MMHG | BODY MASS INDEX: 27.73 KG/M2 | WEIGHT: 156.5 LBS

## 2022-05-31 DIAGNOSIS — R07.9 CHEST PAIN, UNSPECIFIED TYPE: Primary | ICD-10-CM

## 2022-05-31 DIAGNOSIS — I83.893 VARICOSE VEINS OF BOTH LEGS WITH EDEMA: ICD-10-CM

## 2022-05-31 DIAGNOSIS — Z82.49 FAMILY HISTORY OF ISCHEMIC HEART DISEASE (IHD): ICD-10-CM

## 2022-05-31 DIAGNOSIS — E78.2 MIXED HYPERLIPIDEMIA: ICD-10-CM

## 2022-05-31 DIAGNOSIS — I87.2 VENOUS INSUFFICIENCY OF BOTH LOWER EXTREMITIES: ICD-10-CM

## 2022-05-31 DIAGNOSIS — R52 GENERALIZED PAIN: ICD-10-CM

## 2022-05-31 DIAGNOSIS — Z87.891 HISTORY OF SMOKING: ICD-10-CM

## 2022-05-31 PROCEDURE — 99214 PR OFFICE/OUTPT VISIT, EST, LEVL IV, 30-39 MIN: ICD-10-PCS | Mod: S$GLB,,, | Performed by: NURSE PRACTITIONER

## 2022-05-31 PROCEDURE — 1160F RVW MEDS BY RX/DR IN RCRD: CPT | Mod: CPTII,S$GLB,, | Performed by: NURSE PRACTITIONER

## 2022-05-31 PROCEDURE — 99214 OFFICE O/P EST MOD 30 MIN: CPT | Mod: S$GLB,,, | Performed by: NURSE PRACTITIONER

## 2022-05-31 PROCEDURE — 99999 PR PBB SHADOW E&M-EST. PATIENT-LVL III: ICD-10-PCS | Mod: PBBFAC,,, | Performed by: NURSE PRACTITIONER

## 2022-05-31 PROCEDURE — 3008F PR BODY MASS INDEX (BMI) DOCUMENTED: ICD-10-PCS | Mod: CPTII,S$GLB,, | Performed by: NURSE PRACTITIONER

## 2022-05-31 PROCEDURE — 3078F PR MOST RECENT DIASTOLIC BLOOD PRESSURE < 80 MM HG: ICD-10-PCS | Mod: CPTII,S$GLB,, | Performed by: NURSE PRACTITIONER

## 2022-05-31 PROCEDURE — 93000 ELECTROCARDIOGRAM COMPLETE: CPT | Mod: S$GLB,,, | Performed by: INTERNAL MEDICINE

## 2022-05-31 PROCEDURE — 3074F PR MOST RECENT SYSTOLIC BLOOD PRESSURE < 130 MM HG: ICD-10-PCS | Mod: CPTII,S$GLB,, | Performed by: NURSE PRACTITIONER

## 2022-05-31 PROCEDURE — 3008F BODY MASS INDEX DOCD: CPT | Mod: CPTII,S$GLB,, | Performed by: NURSE PRACTITIONER

## 2022-05-31 PROCEDURE — 3044F PR MOST RECENT HEMOGLOBIN A1C LEVEL <7.0%: ICD-10-PCS | Mod: CPTII,S$GLB,, | Performed by: NURSE PRACTITIONER

## 2022-05-31 PROCEDURE — 3078F DIAST BP <80 MM HG: CPT | Mod: CPTII,S$GLB,, | Performed by: NURSE PRACTITIONER

## 2022-05-31 PROCEDURE — 1159F PR MEDICATION LIST DOCUMENTED IN MEDICAL RECORD: ICD-10-PCS | Mod: CPTII,S$GLB,, | Performed by: NURSE PRACTITIONER

## 2022-05-31 PROCEDURE — 3044F HG A1C LEVEL LT 7.0%: CPT | Mod: CPTII,S$GLB,, | Performed by: NURSE PRACTITIONER

## 2022-05-31 PROCEDURE — 99999 PR PBB SHADOW E&M-EST. PATIENT-LVL III: CPT | Mod: PBBFAC,,, | Performed by: NURSE PRACTITIONER

## 2022-05-31 PROCEDURE — 3074F SYST BP LT 130 MM HG: CPT | Mod: CPTII,S$GLB,, | Performed by: NURSE PRACTITIONER

## 2022-05-31 PROCEDURE — 1160F PR REVIEW ALL MEDS BY PRESCRIBER/CLIN PHARMACIST DOCUMENTED: ICD-10-PCS | Mod: CPTII,S$GLB,, | Performed by: NURSE PRACTITIONER

## 2022-05-31 PROCEDURE — 1159F MED LIST DOCD IN RCRD: CPT | Mod: CPTII,S$GLB,, | Performed by: NURSE PRACTITIONER

## 2022-05-31 PROCEDURE — 93000 EKG 12-LEAD: ICD-10-PCS | Mod: S$GLB,,, | Performed by: INTERNAL MEDICINE

## 2022-05-31 NOTE — TELEPHONE ENCOUNTER
Attempted Call, No answer, Patient has an appointment     today with DONNA He                              ----- Message from Emili Lama sent at 5/31/2022 12:48 PM CDT -----  Pt would like to be called back regarding  an appt    Pt can be reached at [phone]

## 2022-05-31 NOTE — PROGRESS NOTES
Cardiology Clinic note    Subjective:   Patient ID:  Nicole Perez is a 61 y.o. female who presents for follow-up of HLP, venous insufficiency, BLE edema    Previous seen by Dr. Schafer for varicose veins, edema, tenderness. No ulcers. Was no wearing compression stockings. ASCVD risk <7.5% 10 year; on statin, monitored by PCP given family history of heart diease.      At last visit, discussion for compression stockings, exercise and weight loss.   Today, she reports compression stockings are not used. Weight down 2 lbs from last year.    Recent visit to nephrology and Tegretol increased. Patient reports she thinks she may have had an anxiety attack and experienced some anxiety and chest discomfort briefly a few minutes while shopping. She denies any other episodes. Denies associated symptoms and no radiation. She also denies SOB/RAINEY, orthopnea, syncope, PND, palpitations. Endorses chronic, mild BLE edema.    Lipid reviewed; at goal  Chol 151  HDL 46  LDL 72  Tri 238    US 2/2017                    No DVT                            R CFV  + > 500 msec reflux               R GSV 4.1 mm + > 500 msec reflux              R LSV 6.1 mm -reflux                 L POP + > 500 msec reflux              L GSV 3.6 mm + > 500 msec reflux              L LSV 5.5 mm -reflux        US 2/2019                 R GSV 4.1 mm + reflux 2392 msec              R LSV 3.4 mm + reflux 2240 msec                 L GSV 4.2 mm - reflux              L LSV 3.2 mm + reflux 2812 msec                 No DVT     Venous US 1/2021                            R GSV 7.4 mm + reflux 2544 msec              R LSV 5.2 mm + reflux 1883 msec                 L GSV 6.6 mm - reflux               L LSV 6.1 mm + reflux 1835 msec                 No DVT         HPI:   Nicole Perez  has a past medical history of Abdominal bloating (02/03/2017), Abnormal Pap smear of cervix, Elevated liver function tests (02/21/2017), Foot swelling (02/03/2017), GERD (gastroesophageal  reflux disease), History of colon polyps, History of Helicobacter pylori infection, History of hematuria (02/21/2017), Hypercholesteremia (02/21/2017), Lactose intolerance (02/03/2017), Prediabetes (02/21/2017), and Spasm of muscle.          Patient Active Problem List    Diagnosis Date Noted    Family history of ischemic heart disease (IHD) 02/22/2021    Overweight (BMI 25.0-29.9) 02/02/2021    Gastroesophageal reflux disease without esophagitis 02/02/2021    Screening for malignant neoplasm of colon 04/09/2019    Venous insufficiency of both lower extremities 10/22/2018          2/2017       No DVT      R CFV  + > 500 msec reflux    R GSV 4.1 mm + > 500 msec reflux   R LSV 6.1 mm -reflux     L POP + > 500 msec reflux   L GSV 3.6 mm + > 500 msec reflux   L LSV 5.5 mm -reflux              Mixed hyperlipidemia 02/14/2018         As of 10/22/2018: 10 yr CV risk is 5.4%        Pre-diabetes 02/14/2018    Muscle spasms of both lower extremities 02/14/2018    Varicose veins of both legs with edema 02/14/2017    Pain of lower extremity 02/14/2017    History of smoking 02/14/2017       Patient's Medications   New Prescriptions    No medications on file   Previous Medications    CARBAMAZEPINE (TEGRETOL XR) 200 MG 12 HR TABLET    Take 200 mg by mouth 2 (two) times daily.    HYDROCODONE-ACETAMINOPHEN (NORCO)  MG PER TABLET    Take 1 tablet by mouth every 6 (six) hours as needed.    MULTIVITAMIN WITH MINERALS TABLET    Take 1 tablet by mouth once daily.    OMEPRAZOLE (PRILOSEC) 40 MG CAPSULE    TAKE 1 CAPSULE BY MOUTH EVERY DAY    ROSUVASTATIN (CRESTOR) 40 MG TAB    Take 1 tablet (40 mg total) by mouth every evening.   Modified Medications    No medications on file   Discontinued Medications    No medications on file        Review of Systems   Constitutional: Negative.   Cardiovascular: Positive for leg swelling.         Objective:   Vitals  There were no vitals filed for this visit.       Physical  Exam      Lab Results    Lab Results   Component Value Date    WBC 5.9 04/23/2022    HGB 12.2 04/23/2022    HCT 38.4 04/23/2022    MCV 87.7 04/23/2022       Lab Results   Component Value Date     04/23/2022       Lab Results   Component Value Date    K 4.7 04/23/2022    MG 1.9 02/03/2021    BUN 9 04/23/2022    CREATININE 0.67 04/23/2022       Lab Results   Component Value Date     (H) 04/23/2022    HGBA1C 5.8 (H) 04/23/2022       Lab Results   Component Value Date    AST 31 04/23/2022    ALT 52 (H) 04/23/2022    ALBUMIN 4.3 04/23/2022    PROT 6.7 04/23/2022       Lab Results   Component Value Date    CHOL 151 04/23/2022    HDL 46 (L) 04/23/2022    LDLCALC 72 04/23/2022    TRIG 238 (H) 04/23/2022       No results found for: CRP, BNP    Cardiac Studies  Significant Imaging: Echocardiogram:   Transthoracic echo (TTE) complete (Cupid Only):   Results for orders placed or performed during the hospital encounter of 02/04/21   Echo Color Flow Doppler? Yes   Result Value Ref Range    LA WIDTH 3.28 cm    AORTIC VALVE CUSP SEPERATION 1.53 cm    PV PEAK VELOCITY 1.34 cm/s    LVIDd 3.86 3.5 - 6.0 cm    IVS 1.04 0.6 - 1.1 cm    Posterior Wall 0.95 0.6 - 1.1 cm    Ao root annulus 2.36 cm    LVIDs 2.59 2.1 - 4.0 cm    FS 33 28 - 44 %    LA volume 47.61 cm3    Sinus 2.63 cm    STJ 1.92 cm    LV mass 119.32 g    LA size 3.51 cm    RVDD 2.67 cm    TAPSE 2.42 cm    RV S' 10.75 cm/s    Left Ventricle Relative Wall Thickness 0.49 cm    AV mean gradient 8 mmHg    AV valve area 2.52 cm2    AV Velocity Ratio 0.79     AV index (prosthetic) 0.69     E/A ratio 0.88     E wave deceleration time 219.94 msec    IVRT 73.82 msec    LVOT diameter 2.16 cm    LVOT area 3.7 cm2    LVOT peak cesar 1.50 m/s    LVOT peak VTI 25.24 cm    Ao peak cesar 1.91 m/s    Ao VTI 36.65 cm    LVOT stroke volume 92.44 cm3    AV peak gradient 15 mmHg    MV Peak E Cesar 0.76 m/s    TR Max Cesar 2.52 m/s    MV Peak A Cesar 0.86 m/s    LV Systolic Volume 24.36 mL     LV Diastolic Volume 64.33 mL    RA Major Axis 3.98 cm    Left Atrium Minor Axis 4.57 cm    Left Atrium Major Axis 5.20 cm    Triscuspid Valve Regurgitation Peak Gradient 25 mmHg    RA Width 3.05 cm    Right Atrial Pressure (from IVC) 3 mmHg    TV rest pulmonary artery pressure 28 mmHg    Narrative    · The left ventricle is normal in size with concentric remodeling and   normal systolic function. The estimated ejection fraction is 60%  · Normal right ventricular size with normal right ventricular systolic   function.  · The estimated PA systolic pressure is 28 mmHg.            Assessment:     1. Varicose veins of both legs with edema    2. Mixed hyperlipidemia    3. Venous insufficiency of both lower extremities    4. Family history of ischemic heart disease (IHD)    5. History of smoking        Plan:     EKG NSR; no ischemic changes  Encouraged to notify for any further chest discomfort; at this time isolated thought to be anxiety per patient; no suspicion for ACS at this time    Will hold off repeat ultrasound as patient states she prefers to defer intervention at this time. +1 edema noted today. Patient states she is trying to watch salt intake and will start with compression stockings.     Referred to neuro per patient's request to be within [facility] network    Continue with current medical plan and lifestyle changes.    No orders of the defined types were placed in this encounter.      Follow up in 1 year with lipid panel  Return sooner for concerns or questions. If symptoms persist go to the ED    She expressed verbal understanding and agreed with the plan    Thank you for the opportunity to care for this patient. Will be available for questions if needed.     Total duration of face to face visit time 30 minutes.  Total time spent counseling greater than fifty percent of total visit time.  Counseling included discussion regarding imaging findings, diagnosis, possibilities, treatment options, risks and  benefits.    Josie Dykes, FNP-C  Cardiology Clinic  [facility]

## 2022-06-14 ENCOUNTER — TELEPHONE (OUTPATIENT)
Dept: GASTROENTEROLOGY | Facility: CLINIC | Age: 62
End: 2022-06-14
Payer: COMMERCIAL

## 2022-07-28 ENCOUNTER — TELEPHONE (OUTPATIENT)
Dept: CARDIOLOGY | Facility: CLINIC | Age: 62
End: 2022-07-28
Payer: COMMERCIAL

## 2022-07-28 NOTE — TELEPHONE ENCOUNTER
Returned pt's call. Pt stated she was speaking to Amy with Dr. Schafer office and would like speak with her again. Forward message to Amy to return pt's call.

## 2022-07-28 NOTE — TELEPHONE ENCOUNTER
----- Message from Lilly Palma sent at 7/28/2022  4:09 PM CDT -----  Type:  Needs Medical Advice    Who Called:  pt  Symptoms (please be specific):  pt said she was just speaking with someone and would like to get a call found information needed     Would the patient rather a call back or a response via [facility]?  Call   Best Call Back Number: [phone]  Additional Information:         Detail Level: Detailed

## 2022-08-30 NOTE — TELEPHONE ENCOUNTER
----- Message from Samina Armstrong sent at 8/30/2022  3:30 PM CDT -----  Contact: Pt [phone]  Requesting an RX refill or new RX.  Is this a refill or new RX: Refill  RX name and strength (copy/paste from chart):  omeprazole (PRILOSEC) 40 MG capsule  Is this a 30 day or 90 day RX: 90  Pharmacy name and phone # (copy/paste from chart):    [email or web address] - [address] [address]  Phone: [phone] Fax: [phone]    The doctors have asked that we provide their patients with the following 2 reminders -- prescription refills can take up to 72 hours, and a friendly reminder that in the future you can use your [facility] account to request refills:    Please call and advise.    Thank You

## 2022-09-07 RX ORDER — OMEPRAZOLE 40 MG/1
40 CAPSULE, DELAYED RELEASE ORAL DAILY
Qty: 30 CAPSULE | Refills: 2 | Status: SHIPPED | OUTPATIENT
Start: 2022-09-07 | End: 2023-02-02 | Stop reason: SDUPTHER

## 2022-10-10 DIAGNOSIS — R07.9 CHEST PAIN, UNSPECIFIED TYPE: ICD-10-CM

## 2022-10-10 DIAGNOSIS — M79.89 BILATERAL SWELLING OF FEET: ICD-10-CM

## 2022-10-10 DIAGNOSIS — E78.2 MIXED HYPERLIPIDEMIA: ICD-10-CM

## 2022-10-10 DIAGNOSIS — E66.3 OVERWEIGHT (BMI 25.0-29.9): ICD-10-CM

## 2022-10-10 DIAGNOSIS — M79.606 PAIN OF LOWER EXTREMITY, UNSPECIFIED LATERALITY: Primary | ICD-10-CM

## 2022-11-11 ENCOUNTER — TELEPHONE (OUTPATIENT)
Dept: PRIMARY CARE CLINIC | Facility: CLINIC | Age: 62
End: 2022-11-11

## 2022-11-11 NOTE — TELEPHONE ENCOUNTER
----- Message from Annie Plunkett sent at 11/11/2022 12:38 PM CST -----  Type:  Sooner Apoointment Request    Caller is requesting a sooner appointment.  Caller declined first available appointment listed below.  Caller will not accept being placed on the waitlist and is requesting a message be sent to doctor.  Name of Caller:pt  When is the first available appointment?n/a  Symptoms:stomach issues  Would the patient rather a call back or a response via [facility]? call  Best Call Back Number:[phone]  Additional Information:

## 2022-11-29 ENCOUNTER — TELEPHONE (OUTPATIENT)
Dept: CARDIOLOGY | Facility: CLINIC | Age: 62
End: 2022-11-29
Payer: COMMERCIAL

## 2022-11-29 NOTE — TELEPHONE ENCOUNTER
Spoke with patient and all her studies have been ordered.    Patient to schedule test [phone].      Nw                              ----- Message from Adenike Kelly sent at 11/29/2022  9:56 AM CST -----  Regarding: tests?  Contact: [phone]/wife Nicole  Patient's wife Nicole is requesting a call back to discuss if he needs any tests prior to his office visit.   Would the patient rather a call back or a response via [facility]?  Call   Best Call Back Number:  [phone]  Additional Information:

## 2023-01-17 ENCOUNTER — OFFICE VISIT (OUTPATIENT)
Dept: OBSTETRICS AND GYNECOLOGY | Facility: CLINIC | Age: 63
End: 2023-01-17
Payer: COMMERCIAL

## 2023-01-17 VITALS
WEIGHT: 156.06 LBS | SYSTOLIC BLOOD PRESSURE: 110 MMHG | DIASTOLIC BLOOD PRESSURE: 70 MMHG | BODY MASS INDEX: 27.65 KG/M2

## 2023-01-17 DIAGNOSIS — Z01.419 ENCOUNTER FOR GYNECOLOGICAL EXAMINATION WITHOUT ABNORMAL FINDING: Primary | ICD-10-CM

## 2023-01-17 DIAGNOSIS — R10.2 PELVIC PAIN: ICD-10-CM

## 2023-01-17 DIAGNOSIS — Z12.31 BREAST CANCER SCREENING BY MAMMOGRAM: ICD-10-CM

## 2023-01-17 PROCEDURE — 3078F DIAST BP <80 MM HG: CPT | Mod: CPTII,S$GLB,, | Performed by: OBSTETRICS & GYNECOLOGY

## 2023-01-17 PROCEDURE — 1159F MED LIST DOCD IN RCRD: CPT | Mod: CPTII,S$GLB,, | Performed by: OBSTETRICS & GYNECOLOGY

## 2023-01-17 PROCEDURE — 99396 PR PREVENTIVE VISIT,EST,40-64: ICD-10-PCS | Mod: S$GLB,,, | Performed by: OBSTETRICS & GYNECOLOGY

## 2023-01-17 PROCEDURE — 1159F PR MEDICATION LIST DOCUMENTED IN MEDICAL RECORD: ICD-10-PCS | Mod: CPTII,S$GLB,, | Performed by: OBSTETRICS & GYNECOLOGY

## 2023-01-17 PROCEDURE — 3074F SYST BP LT 130 MM HG: CPT | Mod: CPTII,S$GLB,, | Performed by: OBSTETRICS & GYNECOLOGY

## 2023-01-17 PROCEDURE — 99999 PR PBB SHADOW E&M-EST. PATIENT-LVL III: CPT | Mod: PBBFAC,,, | Performed by: OBSTETRICS & GYNECOLOGY

## 2023-01-17 PROCEDURE — 3074F PR MOST RECENT SYSTOLIC BLOOD PRESSURE < 130 MM HG: ICD-10-PCS | Mod: CPTII,S$GLB,, | Performed by: OBSTETRICS & GYNECOLOGY

## 2023-01-17 PROCEDURE — 3008F PR BODY MASS INDEX (BMI) DOCUMENTED: ICD-10-PCS | Mod: CPTII,S$GLB,, | Performed by: OBSTETRICS & GYNECOLOGY

## 2023-01-17 PROCEDURE — 3078F PR MOST RECENT DIASTOLIC BLOOD PRESSURE < 80 MM HG: ICD-10-PCS | Mod: CPTII,S$GLB,, | Performed by: OBSTETRICS & GYNECOLOGY

## 2023-01-17 PROCEDURE — 3008F BODY MASS INDEX DOCD: CPT | Mod: CPTII,S$GLB,, | Performed by: OBSTETRICS & GYNECOLOGY

## 2023-01-17 PROCEDURE — 99396 PREV VISIT EST AGE 40-64: CPT | Mod: S$GLB,,, | Performed by: OBSTETRICS & GYNECOLOGY

## 2023-01-17 PROCEDURE — 99999 PR PBB SHADOW E&M-EST. PATIENT-LVL III: ICD-10-PCS | Mod: PBBFAC,,, | Performed by: OBSTETRICS & GYNECOLOGY

## 2023-01-25 ENCOUNTER — HOSPITAL ENCOUNTER (OUTPATIENT)
Dept: RADIOLOGY | Facility: HOSPITAL | Age: 63
Discharge: HOME | End: 2023-01-25
Attending: OBSTETRICS & GYNECOLOGY
Payer: COMMERCIAL

## 2023-01-25 DIAGNOSIS — R10.2 PELVIC PAIN: ICD-10-CM

## 2023-01-25 PROCEDURE — 76856 US PELVIS COMP WITH TRANSVAG NON-OB (XPD): ICD-10-PCS | Mod: 26,,, | Performed by: RADIOLOGY

## 2023-01-25 PROCEDURE — 76830 US PELVIS COMP WITH TRANSVAG NON-OB (XPD): ICD-10-PCS | Mod: 26,,, | Performed by: RADIOLOGY

## 2023-01-25 PROCEDURE — 76830 TRANSVAGINAL US NON-OB: CPT | Mod: 26,,, | Performed by: RADIOLOGY

## 2023-01-25 PROCEDURE — 76856 US EXAM PELVIC COMPLETE: CPT | Mod: 26,,, | Performed by: RADIOLOGY

## 2023-01-25 PROCEDURE — 76856 US EXAM PELVIC COMPLETE: CPT | Mod: TC

## 2023-01-27 ENCOUNTER — HOSPITAL ENCOUNTER (OUTPATIENT)
Dept: CARDIOLOGY | Facility: HOSPITAL | Age: 63
Discharge: HOME | End: 2023-01-27
Attending: INTERNAL MEDICINE
Payer: COMMERCIAL

## 2023-01-27 DIAGNOSIS — M79.606 PAIN OF LOWER EXTREMITY, UNSPECIFIED LATERALITY: ICD-10-CM

## 2023-01-27 DIAGNOSIS — M79.89 BILATERAL SWELLING OF FEET: ICD-10-CM

## 2023-01-27 LAB
LEFT GREAT SAPHENOUS DISTAL THIGH DIA: 0.3 CM
LEFT GREAT SAPHENOUS DISTAL THIGH REFLUX: 502 MS
LEFT GREAT SAPHENOUS JUNCTION DIA: 0.5 CM
LEFT GREAT SAPHENOUS KNEE DIA: 0.3 CM
LEFT GREAT SAPHENOUS MIDDLE THIGH DIA: 0.3 CM
LEFT GREAT SAPHENOUS PROXIMAL CALF DIA: 0.3 CM
LEFT GREAT SAPHENOUS PROXIMAL CALF REFLUX: 665 MS
LEFT SMALL SAPHENOUS KNEE DIA: 0.7 CM
LEFT SMALL SAPHENOUS KNEE REFLUX: 1843 MS
LEFT SMALL SAPHENOUS SPJ DIA: 0.5 CM
LEFT SMALL SAPHENOUS SPJ REFLUX: 2836 MS
RIGHT GREAT SAPHENOUS DISTAL THIGH DIA: 0.5 CM
RIGHT GREAT SAPHENOUS DISTAL THIGH REFLUX: 1405 MS
RIGHT GREAT SAPHENOUS JUNCTION DIA: 0.7 CM
RIGHT GREAT SAPHENOUS KNEE DIA: 0.5 CM
RIGHT GREAT SAPHENOUS KNEE REFLUX: 1452 MS
RIGHT GREAT SAPHENOUS MIDDLE THIGH DIA: 0.4 CM
RIGHT GREAT SAPHENOUS MIDDLE THIGH REFLUX: 808 MS
RIGHT GREAT SAPHENOUS PROXIMAL CALF DIA: 0.4 CM
RIGHT GREAT SAPHENOUS PROXIMAL CALF REFLUX: 634 MS
RIGHT SMALL SAPHENOUS KNEE DIA: 0.4 CM
RIGHT SMALL SAPHENOUS KNEE REFLUX: 1843 MS
RIGHT SMALL SAPHENOUS SPJ DIA: 0.4 CM
RIGHT SMALL SAPHENOUS SPJ REFLUX: 2836 MS

## 2023-01-27 PROCEDURE — 93970 EXTREMITY STUDY: CPT | Mod: TC

## 2023-01-27 PROCEDURE — 93970 EXTREMITY STUDY: CPT | Mod: 26,,, | Performed by: SURGERY

## 2023-01-27 PROCEDURE — 93970 CV US LOWER VENOUS INSUFFICIENCY BILATERAL (CUPID ONLY): ICD-10-PCS | Mod: 26,,, | Performed by: SURGERY

## 2023-02-02 ENCOUNTER — HOSPITAL ENCOUNTER (OUTPATIENT)
Dept: RADIOLOGY | Facility: HOSPITAL | Age: 63
Discharge: HOME | End: 2023-02-02
Attending: STUDENT IN AN ORGANIZED HEALTH CARE EDUCATION/TRAINING PROGRAM
Payer: COMMERCIAL

## 2023-02-02 ENCOUNTER — PATIENT MESSAGE (OUTPATIENT)
Dept: PHARMACY | Facility: CLINIC | Age: 63
End: 2023-02-02
Payer: COMMERCIAL

## 2023-02-02 ENCOUNTER — OFFICE VISIT (OUTPATIENT)
Dept: INTERNAL MEDICINE | Facility: CLINIC | Age: 63
End: 2023-02-02
Payer: COMMERCIAL

## 2023-02-02 VITALS
HEART RATE: 89 BPM | SYSTOLIC BLOOD PRESSURE: 118 MMHG | BODY MASS INDEX: 27.81 KG/M2 | OXYGEN SATURATION: 98 % | HEIGHT: 63 IN | WEIGHT: 156.94 LBS | DIASTOLIC BLOOD PRESSURE: 79 MMHG

## 2023-02-02 DIAGNOSIS — Z76.89 ESTABLISHING CARE WITH NEW DOCTOR, ENCOUNTER FOR: Primary | ICD-10-CM

## 2023-02-02 DIAGNOSIS — R73.03 PRE-DIABETES: ICD-10-CM

## 2023-02-02 DIAGNOSIS — M54.50 ACUTE BILATERAL LOW BACK PAIN WITHOUT SCIATICA: ICD-10-CM

## 2023-02-02 DIAGNOSIS — M62.838 MUSCLE SPASMS OF BOTH LOWER EXTREMITIES: ICD-10-CM

## 2023-02-02 DIAGNOSIS — I83.893 VARICOSE VEINS OF BOTH LEGS WITH EDEMA: ICD-10-CM

## 2023-02-02 DIAGNOSIS — E78.2 MIXED HYPERLIPIDEMIA: ICD-10-CM

## 2023-02-02 DIAGNOSIS — K21.9 GASTROESOPHAGEAL REFLUX DISEASE WITHOUT ESOPHAGITIS: ICD-10-CM

## 2023-02-02 PROCEDURE — 72100 X-RAY EXAM L-S SPINE 2/3 VWS: CPT | Mod: 26,,, | Performed by: RADIOLOGY

## 2023-02-02 PROCEDURE — 72100 X-RAY EXAM L-S SPINE 2/3 VWS: CPT | Mod: TC

## 2023-02-02 PROCEDURE — 72100 XR LUMBAR SPINE AP AND LATERAL: ICD-10-PCS | Mod: 26,,, | Performed by: RADIOLOGY

## 2023-02-02 PROCEDURE — 3078F DIAST BP <80 MM HG: CPT | Mod: CPTII,S$GLB,, | Performed by: STUDENT IN AN ORGANIZED HEALTH CARE EDUCATION/TRAINING PROGRAM

## 2023-02-02 PROCEDURE — 3008F PR BODY MASS INDEX (BMI) DOCUMENTED: ICD-10-PCS | Mod: CPTII,S$GLB,, | Performed by: STUDENT IN AN ORGANIZED HEALTH CARE EDUCATION/TRAINING PROGRAM

## 2023-02-02 PROCEDURE — 1159F PR MEDICATION LIST DOCUMENTED IN MEDICAL RECORD: ICD-10-PCS | Mod: CPTII,S$GLB,, | Performed by: STUDENT IN AN ORGANIZED HEALTH CARE EDUCATION/TRAINING PROGRAM

## 2023-02-02 PROCEDURE — 99999 PR PBB SHADOW E&M-EST. PATIENT-LVL IV: CPT | Mod: PBBFAC,,, | Performed by: STUDENT IN AN ORGANIZED HEALTH CARE EDUCATION/TRAINING PROGRAM

## 2023-02-02 PROCEDURE — 1159F MED LIST DOCD IN RCRD: CPT | Mod: CPTII,S$GLB,, | Performed by: STUDENT IN AN ORGANIZED HEALTH CARE EDUCATION/TRAINING PROGRAM

## 2023-02-02 PROCEDURE — 99999 PR PBB SHADOW E&M-EST. PATIENT-LVL IV: ICD-10-PCS | Mod: PBBFAC,,, | Performed by: STUDENT IN AN ORGANIZED HEALTH CARE EDUCATION/TRAINING PROGRAM

## 2023-02-02 PROCEDURE — 3074F SYST BP LT 130 MM HG: CPT | Mod: CPTII,S$GLB,, | Performed by: STUDENT IN AN ORGANIZED HEALTH CARE EDUCATION/TRAINING PROGRAM

## 2023-02-02 PROCEDURE — 3078F PR MOST RECENT DIASTOLIC BLOOD PRESSURE < 80 MM HG: ICD-10-PCS | Mod: CPTII,S$GLB,, | Performed by: STUDENT IN AN ORGANIZED HEALTH CARE EDUCATION/TRAINING PROGRAM

## 2023-02-02 PROCEDURE — 1160F RVW MEDS BY RX/DR IN RCRD: CPT | Mod: CPTII,S$GLB,, | Performed by: STUDENT IN AN ORGANIZED HEALTH CARE EDUCATION/TRAINING PROGRAM

## 2023-02-02 PROCEDURE — 3074F PR MOST RECENT SYSTOLIC BLOOD PRESSURE < 130 MM HG: ICD-10-PCS | Mod: CPTII,S$GLB,, | Performed by: STUDENT IN AN ORGANIZED HEALTH CARE EDUCATION/TRAINING PROGRAM

## 2023-02-02 PROCEDURE — 1160F PR REVIEW ALL MEDS BY PRESCRIBER/CLIN PHARMACIST DOCUMENTED: ICD-10-PCS | Mod: CPTII,S$GLB,, | Performed by: STUDENT IN AN ORGANIZED HEALTH CARE EDUCATION/TRAINING PROGRAM

## 2023-02-02 PROCEDURE — 3008F BODY MASS INDEX DOCD: CPT | Mod: CPTII,S$GLB,, | Performed by: STUDENT IN AN ORGANIZED HEALTH CARE EDUCATION/TRAINING PROGRAM

## 2023-02-02 PROCEDURE — 99214 PR OFFICE/OUTPT VISIT, EST, LEVL IV, 30-39 MIN: ICD-10-PCS | Mod: S$GLB,,, | Performed by: STUDENT IN AN ORGANIZED HEALTH CARE EDUCATION/TRAINING PROGRAM

## 2023-02-02 PROCEDURE — 99214 OFFICE O/P EST MOD 30 MIN: CPT | Mod: S$GLB,,, | Performed by: STUDENT IN AN ORGANIZED HEALTH CARE EDUCATION/TRAINING PROGRAM

## 2023-02-02 RX ORDER — OMEPRAZOLE 40 MG/1
40 CAPSULE, DELAYED RELEASE ORAL DAILY
Qty: 90 CAPSULE | Refills: 3 | Status: SHIPPED | OUTPATIENT
Start: 2023-02-02 | End: 2024-03-27 | Stop reason: SDUPTHER

## 2023-02-02 NOTE — PROGRESS NOTES
"SUBJECTIVE     No chief complaint on file.      HPI  Nicole Perez is a 62 y.o. female with  Prediabetes, HLD, GERD, varicose veins, history of tobacco use  that presents for establishment of care.     Muscles spasms for which she sees neurology. Multiple work up were negative. Takes Carbamazepine 200 mg BID. Multiple sites: extremities, abdomen. Last 15-40 minutes.     Lower back pain radiating to the abdomen over past several months. Sometimes bothered with tight clothing. Pelvic US in 2023 for dyspareunia was negative. No prior injuries.     GERD  Omeprazole, has been out of the medication.     HLD  Maintained on Rosuvastatin.     Prediabetes:   A1c 5.8% in 2022    HCM:   Pap due in .   Mammogram - Scheduled , ordered by Gyn  Colonoscopy - 2 mm polyp in sigmoid colon w/ benign pathology in 2019, will be due in 2024.   Vaccines: declines flu and covid booster. Amenable to shingles vaccine.     PAST MEDICAL HISTORY:  Past Medical History:   Diagnosis Date    Abdominal bloating 2017    Abnormal Pap smear of cervix     Elevated liver function tests 2017    Foot swelling 2017    Bilateral    GERD (gastroesophageal reflux disease)     History of colon polyps     History of Helicobacter pylori infection     History of hematuria 2017    Hypercholesteremia 2017    Lactose intolerance 2017    Prediabetes 2017    Spasm of muscle     Diffuse muscle spasms "for many years." On meds for this.       PAST SURGICAL HISTORY:  Past Surgical History:   Procedure Laterality Date    breast reduction Bilateral      SECTION      CHOLECYSTECTOMY      COLONOSCOPY N/A 2019    Procedure: COLONOSCOPY/Golytely;  Surgeon: Angela Staples MD;  Location: [facility];  Service: Endoscopy;  Laterality: N/A;    COLONOSCOPY W/ POLYPECTOMY      HYSTERECTOMY      TOTAL ABDOMINAL HYSTERECTOMY W/ BILATERAL SALPINGOOPHORECTOMY      TOTAL REDUCTION MAMMOPLASTY      tumnahum kaur Bilateral  "       FAMILY HISTORY:  Family History   Problem Relation Age of Onset    Heart disease Father     Hypertension Father     Heart disease Mother     Hypertension Mother        ALLERGIES AND MEDICATIONS: updated and reviewed.  Review of patient's allergies indicates:  No Known Allergies  Current Outpatient Medications   Medication Sig Dispense Refill    carBAMazepine (TEGRETOL XR) 200 MG 12 hr tablet Take 200 mg by mouth 2 (two) times daily.      HYDROcodone-acetaminophen (NORCO)  mg per tablet Take 1 tablet by mouth every 6 (six) hours as needed.      multivitamin with minerals tablet Take 1 tablet by mouth once daily.      omeprazole (PRILOSEC) 40 MG capsule Take 1 capsule (40 mg total) by mouth once daily. 30 capsule 2    rosuvastatin (CRESTOR) 40 MG Tab TAKE 1 TABLET EVERY EVENING 90 tablet 3     No current facility-administered medications for this visit.       ROS  Review of Systems   Constitutional:  Negative for activity change, chills and fever.   HENT:  Negative for congestion and hearing loss.    Eyes:  Negative for pain and visual disturbance.   Respiratory:  Negative for cough and shortness of breath.    Cardiovascular:  Negative for chest pain and palpitations.   Gastrointestinal:  Negative for abdominal pain, constipation, diarrhea, nausea and vomiting.   Endocrine: Negative.    Genitourinary:  Positive for dyspareunia. Negative for dysuria, hematuria and urgency.   Musculoskeletal:  Positive for myalgias (muscle spasms). Negative for arthralgias.   Skin: Negative.    Allergic/Immunologic: Negative.    Neurological:  Negative for dizziness, light-headedness and headaches.   Hematological: Negative.        OBJECTIVE     Physical Exam  Vitals:    02/02/23 1319   BP: 118/79   Pulse: 89    Body mass index is 27.81 kg/m².            Physical Exam  Vitals reviewed.   Constitutional:       General: She is not in acute distress.     Appearance: Normal appearance.   HENT:      Head: Normocephalic and  atraumatic.      Right Ear: Tympanic membrane, ear canal and external ear normal.      Left Ear: Tympanic membrane, ear canal and external ear normal.      Nose: Nose normal.      Mouth/Throat:      Mouth: Mucous membranes are moist.      Pharynx: Oropharynx is clear.   Eyes:      Extraocular Movements: Extraocular movements intact.      Conjunctiva/sclera: Conjunctivae normal.      Pupils: Pupils are equal, round, and reactive to light.   Cardiovascular:      Rate and Rhythm: Normal rate and regular rhythm.      Pulses: Normal pulses.      Heart sounds: Normal heart sounds.   Pulmonary:      Effort: Pulmonary effort is normal.      Breath sounds: Normal breath sounds.   Abdominal:      General: Bowel sounds are normal. There is no distension.      Palpations: Abdomen is soft. There is no mass.      Tenderness: There is no abdominal tenderness. There is no guarding.   Musculoskeletal:         General: Normal range of motion.      Cervical back: Normal range of motion and neck supple. No rigidity or tenderness.      Thoracic back: No tenderness or bony tenderness.      Lumbar back: No spasms, tenderness or bony tenderness. Negative right straight leg raise test and negative left straight leg raise test.      Right lower leg: No edema.      Left lower leg: No edema.   Lymphadenopathy:      Cervical: No cervical adenopathy.   Skin:     General: Skin is warm and dry.   Neurological:      General: No focal deficit present.      Mental Status: She is alert.   Psychiatric:         Mood and Affect: Mood normal.         Behavior: Behavior normal.         Health Maintenance         Date Due Completion Date    Shingles Vaccine (1 of 2) Never done ---    COVID-19 Vaccine (3 - Booster for Pfizer series) 06/06/2021 4/11/2021    Influenza Vaccine (1) Never done ---    Mammogram 04/21/2023 4/21/2022    Hemoglobin A1c (Prediabetes) 04/23/2023 4/23/2022    Colorectal Cancer Screening 04/09/2024 4/9/2019    Lipid Panel 01/27/2028  1/27/2023    TETANUS VACCINE 02/14/2028 2/14/2018 (Declined)    Override on 2/14/2018: Declined              ASSESSMENT     62 y.o. female with     1. Establishing care with new doctor, encounter for    2. Acute bilateral low back pain without sciatica    3. Muscle spasms of both lower extremities    4. Varicose veins of both legs with edema    5. Mixed hyperlipidemia    6. Pre-diabetes    7. Gastroesophageal reflux disease without esophagitis        PLAN:     1. Establishing care with new doctor, encounter for  - Prior notes/labs/imaging reviewed.    2. Acute bilateral low back pain without sciatica  - Bilateral lower back pain radiating from sides to the abdomen.   - X-Ray Lumbar Spine AP And Lateral; Future  - If X-ray negative, consider CT scan abd/pelvis.     3. Muscle spasms of both lower extremities  - On Carbamazepine. Continue follow up with Neurology.     4. Varicose veins of both legs with edema  - Continue follow up with Cardiology.     5. Mixed hyperlipidemia  - Stable on Statin. Continue.     6. Pre-diabetes  - monitor diet, brayan carb intake.     7. Gastroesophageal reflux disease without esophagitis  - Stable on PPI. Continue.         RTC in 1 month.      Farrukh Dorman MD  02/02/2023 12:31 PM        No follow-ups on file.

## 2023-02-06 ENCOUNTER — PATIENT MESSAGE (OUTPATIENT)
Dept: INTERNAL MEDICINE | Facility: CLINIC | Age: 63
End: 2023-02-06
Payer: COMMERCIAL

## 2023-02-06 DIAGNOSIS — G89.29 CHRONIC PELVIC PAIN IN FEMALE: ICD-10-CM

## 2023-02-06 DIAGNOSIS — R10.9 CHRONIC ABDOMINAL PAIN: Primary | ICD-10-CM

## 2023-02-06 DIAGNOSIS — G89.29 CHRONIC ABDOMINAL PAIN: Primary | ICD-10-CM

## 2023-02-06 DIAGNOSIS — R10.2 CHRONIC PELVIC PAIN IN FEMALE: ICD-10-CM

## 2023-02-06 NOTE — TELEPHONE ENCOUNTER
Chronic abdominal pain and pelvic pain in post-menopausal female associated with dyspareunia and recent negative TVUS. X-ray of L-spine showing mild DJD, not consistent with severity of symptoms. Order for CT abd/pelvis ordered.

## 2023-02-08 ENCOUNTER — TELEPHONE (OUTPATIENT)
Dept: OBSTETRICS AND GYNECOLOGY | Facility: CLINIC | Age: 63
End: 2023-02-08
Payer: COMMERCIAL

## 2023-02-13 ENCOUNTER — OFFICE VISIT (OUTPATIENT)
Dept: CARDIOLOGY | Facility: CLINIC | Age: 63
End: 2023-02-13
Payer: COMMERCIAL

## 2023-02-13 ENCOUNTER — HOSPITAL ENCOUNTER (OUTPATIENT)
Dept: RADIOLOGY | Facility: HOSPITAL | Age: 63
Discharge: HOME | End: 2023-02-13
Attending: STUDENT IN AN ORGANIZED HEALTH CARE EDUCATION/TRAINING PROGRAM
Payer: COMMERCIAL

## 2023-02-13 VITALS
SYSTOLIC BLOOD PRESSURE: 123 MMHG | BODY MASS INDEX: 28 KG/M2 | HEIGHT: 63 IN | HEART RATE: 84 BPM | DIASTOLIC BLOOD PRESSURE: 77 MMHG | WEIGHT: 158.06 LBS

## 2023-02-13 DIAGNOSIS — R10.9 CHRONIC ABDOMINAL PAIN: ICD-10-CM

## 2023-02-13 DIAGNOSIS — G89.29 CHRONIC PELVIC PAIN IN FEMALE: ICD-10-CM

## 2023-02-13 DIAGNOSIS — E78.2 MIXED HYPERLIPIDEMIA: ICD-10-CM

## 2023-02-13 DIAGNOSIS — I87.2 VENOUS INSUFFICIENCY OF BOTH LOWER EXTREMITIES: Primary | ICD-10-CM

## 2023-02-13 DIAGNOSIS — I83.893 VARICOSE VEINS OF BOTH LEGS WITH EDEMA: ICD-10-CM

## 2023-02-13 DIAGNOSIS — G89.29 CHRONIC ABDOMINAL PAIN: ICD-10-CM

## 2023-02-13 DIAGNOSIS — M79.606 PAIN OF LOWER EXTREMITY, UNSPECIFIED LATERALITY: ICD-10-CM

## 2023-02-13 DIAGNOSIS — R10.2 CHRONIC PELVIC PAIN IN FEMALE: ICD-10-CM

## 2023-02-13 PROCEDURE — 74177 CT ABDOMEN PELVIS WITH CONTRAST: ICD-10-PCS | Mod: 26,,, | Performed by: RADIOLOGY

## 2023-02-13 PROCEDURE — 3078F PR MOST RECENT DIASTOLIC BLOOD PRESSURE < 80 MM HG: ICD-10-PCS | Mod: CPTII,S$GLB,, | Performed by: INTERNAL MEDICINE

## 2023-02-13 PROCEDURE — 3074F SYST BP LT 130 MM HG: CPT | Mod: CPTII,S$GLB,, | Performed by: INTERNAL MEDICINE

## 2023-02-13 PROCEDURE — 1159F MED LIST DOCD IN RCRD: CPT | Mod: CPTII,S$GLB,, | Performed by: INTERNAL MEDICINE

## 2023-02-13 PROCEDURE — 3078F DIAST BP <80 MM HG: CPT | Mod: CPTII,S$GLB,, | Performed by: INTERNAL MEDICINE

## 2023-02-13 PROCEDURE — 74177 CT ABD & PELVIS W/CONTRAST: CPT | Mod: TC

## 2023-02-13 PROCEDURE — 99215 PR OFFICE/OUTPT VISIT, EST, LEVL V, 40-54 MIN: ICD-10-PCS | Mod: S$GLB,,, | Performed by: INTERNAL MEDICINE

## 2023-02-13 PROCEDURE — 3008F PR BODY MASS INDEX (BMI) DOCUMENTED: ICD-10-PCS | Mod: CPTII,S$GLB,, | Performed by: INTERNAL MEDICINE

## 2023-02-13 PROCEDURE — 3008F BODY MASS INDEX DOCD: CPT | Mod: CPTII,S$GLB,, | Performed by: INTERNAL MEDICINE

## 2023-02-13 PROCEDURE — 25500020 PHARM REV CODE 255: Performed by: STUDENT IN AN ORGANIZED HEALTH CARE EDUCATION/TRAINING PROGRAM

## 2023-02-13 PROCEDURE — 99999 PR PBB SHADOW E&M-EST. PATIENT-LVL III: ICD-10-PCS | Mod: PBBFAC,,, | Performed by: INTERNAL MEDICINE

## 2023-02-13 PROCEDURE — 99215 OFFICE O/P EST HI 40 MIN: CPT | Mod: S$GLB,,, | Performed by: INTERNAL MEDICINE

## 2023-02-13 PROCEDURE — 74177 CT ABD & PELVIS W/CONTRAST: CPT | Mod: 26,,, | Performed by: RADIOLOGY

## 2023-02-13 PROCEDURE — 99999 PR PBB SHADOW E&M-EST. PATIENT-LVL III: CPT | Mod: PBBFAC,,, | Performed by: INTERNAL MEDICINE

## 2023-02-13 PROCEDURE — A9698 NON-RAD CONTRAST MATERIALNOC: HCPCS | Performed by: STUDENT IN AN ORGANIZED HEALTH CARE EDUCATION/TRAINING PROGRAM

## 2023-02-13 PROCEDURE — 1159F PR MEDICATION LIST DOCUMENTED IN MEDICAL RECORD: ICD-10-PCS | Mod: CPTII,S$GLB,, | Performed by: INTERNAL MEDICINE

## 2023-02-13 PROCEDURE — 3074F PR MOST RECENT SYSTOLIC BLOOD PRESSURE < 130 MM HG: ICD-10-PCS | Mod: CPTII,S$GLB,, | Performed by: INTERNAL MEDICINE

## 2023-02-13 RX ADMIN — IOHEXOL 75 ML: 350 INJECTION, SOLUTION INTRAVENOUS at 09:02

## 2023-02-13 RX ADMIN — BARIUM SULFATE 450 ML: 20 SUSPENSION ORAL at 09:02

## 2023-02-13 NOTE — PROGRESS NOTES
Subjective:   Patient ID:  Nicole Perez is a 62 y.o. female who presents for evaluation and treatment of Hyperlipidemia, Venous Insufficiency, and Edema      HPI:       She is here for evaluation of venous insufficiency complicated with varicose veins, edema, hyperpigmentation, and tenderness. No ulcers. She is wearing compression stockings per previous recommendations.       She also has HLP with less than 7.5 % 10 yr CV risk. Currently on statin therapy initiated and monitored by pcp.  She has a family history of ischemic heart disease and opted to continue statin.      CEAP 4c  VCSS 12        US 2/2017       No DVT      R CFV  + > 500 msec reflux    R GSV 4.1 mm + > 500 msec reflux   R LSV 6.1 mm -reflux     L POP + > 500 msec reflux   L GSV 3.6 mm + > 500 msec reflux   L LSV 5.5 mm -reflux      US 2/2019     R GSV 4.1 mm + reflux 2392 msec   R LSV 3.4 mm + reflux 2240 msec     L GSV 4.2 mm - reflux   L LSV 3.2 mm + reflux 2812 msec     No DVT       Echo 2/2019     Normal EF   Normal diastolic parameters   No valvular disease   Normal PA pressure      Echo 2/2021     Normal EF   Normal RV fn   PASP 28 mmHg       ECG 2/22/2021     NSR with non-specific changes          Venous US 1/2021      R GSV 7.4 mm + reflux 2544 msec   R LSV 5.2 mm + reflux 1883 msec     L GSV 6.6 mm - reflux    L LSV 6.1 mm + reflux 1835 msec     No DVT       US 1/2023     No DVT        R GSV + reflux > 500 msec; 7 mm    R LSV + reflux > 500 msec; 5 mm      L GSV + reflux > 500 msec; 5 mm   L LSV + reflux > 500 msec; 7 mm              The 10-year ASCVD risk score (Catalino BAY, et al., 2019) is: 3%    Values used to calculate the score:      Age: 62 years      Sex: Female      Is Non- : No      Diabetic: No      Tobacco smoker: No      Systolic Blood Pressure: 123 mmHg      Is BP treated: No      HDL Cholesterol: 47 mg/dL      Total Cholesterol: 170 mg/dL      Patient Active Problem List    Diagnosis Date Noted     Family history of ischemic heart disease (IHD) 2021    Overweight (BMI 25.0-29.9) 2021    Gastroesophageal reflux disease without esophagitis 2021    Screening for malignant neoplasm of colon 2019    Venous insufficiency of both lower extremities 10/22/2018          2017       No DVT      R CFV  + > 500 msec reflux    R GSV 4.1 mm + > 500 msec reflux   R LSV 6.1 mm -reflux     L POP + > 500 msec reflux   L GSV 3.6 mm + > 500 msec reflux   L LSV 5.5 mm -reflux              Mixed hyperlipidemia 2018         As of 10/22/2018: 10 yr CV risk is 5.4%        Pre-diabetes 2018    Muscle spasms of both lower extremities 2018    Varicose veins of both legs with edema 2017    Pain of lower extremity 2017    History of smoking 2017           Right Arm BP - Sittin/83  Left Arm BP - Sittin/77        LABS      Lipid panel  Lab Results   Component Value Date    CHOL 170 2023    CHOL 151 2022    CHOL 159 2022     Lab Results   Component Value Date    HDL 47 2023    HDL 46 (L) 2022    HDL 42 2022     Lab Results   Component Value Date    LDLCALC 88.2 2023    LDLCALC 72 2022    LDLCALC 68.4 2022     Lab Results   Component Value Date    TRIG 174 (H) 2023    TRIG 238 (H) 2022    TRIG 243 (H) 2022     Lab Results   Component Value Date    CHOLHDL 27.6 2023    CHOLHDL 3.3 2022    CHOLHDL 26.4 2022            Review of Systems   Constitutional: Negative for diaphoresis, night sweats, weight gain and weight loss.   HENT:  Negative for congestion.    Eyes:  Negative for blurred vision, discharge and double vision.   Cardiovascular:  Negative for chest pain, claudication, cyanosis, dyspnea on exertion, irregular heartbeat, leg swelling, near-syncope, orthopnea, palpitations, paroxysmal nocturnal dyspnea and syncope.   Respiratory:  Negative for cough, shortness of breath and  wheezing.    Endocrine: Negative for cold intolerance, heat intolerance and polyphagia.   Hematologic/Lymphatic: Negative for adenopathy and bleeding problem. Does not bruise/bleed easily.   Skin:  Negative for dry skin and nail changes.   Musculoskeletal:  Negative for arthritis, back pain, falls, joint pain, myalgias and neck pain.   Gastrointestinal:  Negative for bloating, abdominal pain, change in bowel habit and constipation.   Genitourinary:  Negative for bladder incontinence, dysuria, flank pain, genital sores and missed menses.   Neurological:  Negative for aphonia, brief paralysis, difficulty with concentration, dizziness and weakness.   Psychiatric/Behavioral:  Negative for altered mental status and memory loss. The patient does not have insomnia.    Allergic/Immunologic: Negative for environmental allergies.     Objective:   Physical Exam  Constitutional:       Appearance: She is well-developed.      Interventions: She is not intubated.  HENT:      Head: Normocephalic and atraumatic.      Right Ear: External ear normal.      Left Ear: External ear normal.   Eyes:      General: No scleral icterus.        Right eye: No discharge.         Left eye: No discharge.      Conjunctiva/sclera: Conjunctivae normal.      Pupils: Pupils are equal, round, and reactive to light.   Neck:      Thyroid: No thyromegaly.      Vascular: Normal carotid pulses. No carotid bruit, hepatojugular reflux or JVD.      Trachea: No tracheal deviation.   Cardiovascular:      Rate and Rhythm: Normal rate and regular rhythm. No extrasystoles are present.     Chest Wall: PMI is not displaced.      Pulses:           Carotid pulses are 2+ on the right side and 2+ on the left side.       Radial pulses are 2+ on the right side and 2+ on the left side.        Femoral pulses are 2+ on the right side and 2+ on the left side.       Popliteal pulses are 2+ on the right side and 2+ on the left side.        Dorsalis pedis pulses are 2+ on the right  side and 2+ on the left side.        Posterior tibial pulses are 2+ on the right side and 2+ on the left side.      Heart sounds: S1 normal and S2 normal. Heart sounds not distant. No midsystolic click. No murmur heard.    No friction rub. No gallop. No S3 sounds.   Pulmonary:      Effort: Pulmonary effort is normal. No tachypnea, bradypnea, accessory muscle usage or respiratory distress. She is not intubated.      Breath sounds: Normal breath sounds. No stridor. No decreased breath sounds, wheezing or rales.   Chest:      Chest wall: No tenderness.   Abdominal:      General: There is no distension or abdominal bruit.      Palpations: There is no mass or pulsatile mass.      Tenderness: There is no abdominal tenderness. There is no guarding or rebound.   Musculoskeletal:         General: No tenderness. Normal range of motion.      Cervical back: Normal range of motion and neck supple.   Lymphadenopathy:      Cervical: No cervical adenopathy.      Comments:     1 to 2 + edema of both ankles         Skin:     General: Skin is warm.      Coloration: Skin is not pale.      Findings: No erythema or rash.      Comments:     Hyperpigmentation of both ankles R > L          Neurological:      Mental Status: She is alert and oriented to person, place, and time.      Cranial Nerves: No cranial nerve deficit.      Coordination: Coordination normal.      Deep Tendon Reflexes: Reflexes are normal and symmetric.   Psychiatric:         Behavior: Behavior normal.         Thought Content: Thought content normal.         Judgment: Judgment normal.     2/2023                  Assessment:     1. Venous insufficiency of both lower extremities    2. Varicose veins of both legs with edema    3. Mixed hyperlipidemia    4. Pain of lower extremity, unspecified laterality          Plan:           We had a long discussion regarding the pathophysiology of venous insufficiency.  We discussed both superficial and deep venous reflux disease.   Initial treatment is usually conservative with compression stockings, exercise, weight loss, and calf muscle compressive therapy.  If conservative measures fail then we will proceed with superficial venous reflux disease management with ablative therapy.  If symptoms persist then we will proceed with evaluation of deep venous reflux disease.  This requires a venogram with intravascular ultrasound for guidance of intervention if clinically indicated.  The patient expressed verbal understanding of the plan.          She would like to proceed with right GSV ablation with Varithena        Exercise  Weight loss  Compression stockings 20-30 mmHg  Dietary changes  Calcium score for risk stratification   Lipid panel prior to follow up   Continue with crestor         Consider EVLT for superficial disease if there is no improvement with conservative. If there is no improvement after management superficial venous reflux disease, she will proceed with venogram. US in 2/2017 revealed bilateral GSV + deep venous reflux          Continue with current medical plan and lifestyle changes.  Return sooner for concerns or questions. If symptoms persist go to the ED  I have reviewed all pertinent data on this patient       I have reviewed the patient's medical history in detail and updated the computerized patient record.    Orders Placed This Encounter   Procedures    Case Request-Cath Lab: Ablation     Standing Status:   Standing     Number of Occurrences:   1     Order Specific Question:   CPT Code:     Answer:   IA ENDOVENOUS RF, 1ST VEIN [94738]     Order Specific Question:   CPT Code:     Answer:   IA ENDOVENOUS ABLATION THER CHEM ADHESIVE, 1ST VEIN [12615]     Order Specific Question:   CPT Code:     Answer:   IA INJ, NONCMPND FOAM SCLEROSANT, 1 VEIN [52714]     Order Specific Question:   CPT Code:     Answer:   IA  US GUIDE, VASCULAR ACCESS [15372]     Order Specific Question:   PAT Visit Needed?     Answer:   PAT Anesthesia  Triage [105]     Order Specific Question:   Medical Necessity:     Answer:   Medically Urgent [101]     Order Specific Question:   Is an on-site pathologist required for this procedure?     Answer:   N/A       Follow up as scheduled. Return sooner for concerns or questions            She expressed verbal understanding and agreed with the plan    Greater than 50% of the visit of 45 minutes was spent counseling, educating, and coordinating the care of the patient.          Patient's Medications   New Prescriptions    No medications on file   Previous Medications    CARBAMAZEPINE (TEGRETOL XR) 200 MG 12 HR TABLET    Take 200 mg by mouth 2 (two) times daily.    MULTIVITAMIN WITH MINERALS TABLET    Take 1 tablet by mouth once daily.    OMEPRAZOLE (PRILOSEC) 40 MG CAPSULE    Take 1 capsule (40 mg total) by mouth once daily.    ROSUVASTATIN (CRESTOR) 40 MG TAB    TAKE 1 TABLET EVERY EVENING   Modified Medications    No medications on file   Discontinued Medications    No medications on file

## 2023-03-16 ENCOUNTER — HOSPITAL ENCOUNTER (OUTPATIENT)
Facility: HOSPITAL | Age: 63
Discharge: HOME | End: 2023-03-16
Attending: INTERNAL MEDICINE | Admitting: INTERNAL MEDICINE
Payer: COMMERCIAL

## 2023-03-16 VITALS
HEIGHT: 63 IN | TEMPERATURE: 98 F | OXYGEN SATURATION: 100 % | HEART RATE: 83 BPM | BODY MASS INDEX: 28 KG/M2 | RESPIRATION RATE: 19 BRPM | SYSTOLIC BLOOD PRESSURE: 141 MMHG | DIASTOLIC BLOOD PRESSURE: 78 MMHG | WEIGHT: 158 LBS

## 2023-03-16 DIAGNOSIS — I87.2 VENOUS INSUFFICIENCY OF BOTH LOWER EXTREMITIES: ICD-10-CM

## 2023-03-16 PROCEDURE — C9399 UNCLASSIFIED DRUGS OR BIOLOG: HCPCS | Performed by: INTERNAL MEDICINE

## 2023-03-16 PROCEDURE — 36465 NJX NONCMPND SCLRSNT 1 VEIN: CPT | Mod: RT | Performed by: INTERNAL MEDICINE

## 2023-03-16 PROCEDURE — 36465 PR INJ, NONCMPND FOAM SCLEROSANT, 1 VEIN: ICD-10-PCS | Mod: RT,,, | Performed by: INTERNAL MEDICINE

## 2023-03-16 PROCEDURE — 25000003 PHARM REV CODE 250: Performed by: INTERNAL MEDICINE

## 2023-03-16 PROCEDURE — 63600175 PHARM REV CODE 636 W HCPCS: Performed by: INTERNAL MEDICINE

## 2023-03-16 PROCEDURE — 36465 NJX NONCMPND SCLRSNT 1 VEIN: CPT | Mod: RT,,, | Performed by: INTERNAL MEDICINE

## 2023-03-16 PROCEDURE — C1894 INTRO/SHEATH, NON-LASER: HCPCS | Performed by: INTERNAL MEDICINE

## 2023-03-16 RX ORDER — LIDOCAINE HYDROCHLORIDE 10 MG/ML
INJECTION INFILTRATION; PERINEURAL
Status: DISCONTINUED | OUTPATIENT
Start: 2023-03-16 | End: 2023-03-16 | Stop reason: HOSPADM

## 2023-03-16 NOTE — Clinical Note
The site was prepped with ChloraPrep. The patient was draped. The patient was positioned supine. Right leg

## 2023-03-16 NOTE — H&P
Patient ID:  Nicole Perez is a 62 y.o. female who presents for evaluation and treatment of Hyperlipidemia, Venous Insufficiency, and Edema        HPI:         She is here for evaluation of venous insufficiency complicated with varicose veins, edema, hyperpigmentation, and tenderness. No ulcers. She is wearing compression stockings per previous recommendations.         She also has HLP with less than 7.5 % 10 yr CV risk. Currently on statin therapy initiated and monitored by pcp.  She has a family history of ischemic heart disease and opted to continue statin.        CEAP 4c  VCSS 12           US 2/2017                    No DVT                            R CFV  + > 500 msec reflux               R GSV 4.1 mm + > 500 msec reflux              R LSV 6.1 mm -reflux                 L POP + > 500 msec reflux              L GSV 3.6 mm + > 500 msec reflux              L LSV 5.5 mm -reflux        US 2/2019                 R GSV 4.1 mm + reflux 2392 msec              R LSV 3.4 mm + reflux 2240 msec                 L GSV 4.2 mm - reflux              L LSV 3.2 mm + reflux 2812 msec                 No DVT         Echo 2/2019                 Normal EF              Normal diastolic parameters              No valvular disease              Normal PA pressure        Echo 2/2021                 Normal EF              Normal RV fn              PASP 28 mmHg         ECG 2/22/2021                 NSR with non-specific changes                       Venous US 1/2021                            R GSV 7.4 mm + reflux 2544 msec              R LSV 5.2 mm + reflux 1883 msec                 L GSV 6.6 mm - reflux               L LSV 6.1 mm + reflux 1835 msec                 No DVT         US 1/2023                 No DVT                               R GSV + reflux > 500 msec; 7 mm               R LSV + reflux > 500 msec; 5 mm                  L GSV + reflux > 500 msec; 5 mm              L LSV + reflux > 500 msec; 7 mm                              The 10-year ASCVD risk score (Catalino BAY, et al., 2019) is: 3%    Values used to calculate the score:      Age: 62 years      Sex: Female      Is Non- : No      Diabetic: No      Tobacco smoker: No      Systolic Blood Pressure: 123 mmHg      Is BP treated: No      HDL Cholesterol: 47 mg/dL      Total Cholesterol: 170 mg/dL              Patient Active Problem List     Diagnosis Date Noted    Family history of ischemic heart disease (IHD) 2021    Overweight (BMI 25.0-29.9) 2021    Gastroesophageal reflux disease without esophagitis 2021    Screening for malignant neoplasm of colon 2019    Venous insufficiency of both lower extremities 10/22/2018              2017                    No DVT                            R CFV  + > 500 msec reflux               R GSV 4.1 mm + > 500 msec reflux              R LSV 6.1 mm -reflux                 L POP + > 500 msec reflux              L GSV 3.6 mm + > 500 msec reflux              L LSV 5.5 mm -reflux                   Mixed hyperlipidemia 2018             As of 10/22/2018: 10 yr CV risk is 5.4%          Pre-diabetes 2018    Muscle spasms of both lower extremities 2018    Varicose veins of both legs with edema 2017    Pain of lower extremity 2017    History of smoking 2017               Right Arm BP - Sittin/83  Left Arm BP - Sittin/77           LABS        Lipid panel        Lab Results   Component Value Date     CHOL 170 2023     CHOL 151 2022     CHOL 159 2022            Lab Results   Component Value Date     HDL 47 2023     HDL 46 (L) 2022     HDL 42 2022            Lab Results   Component Value Date     LDLCALC 88.2 2023     LDLCALC 72 2022     LDLCALC 68.4 2022            Lab Results   Component Value Date     TRIG 174 (H) 2023     TRIG 238 (H) 2022     TRIG 243 (H) 2022            Lab Results    Component Value Date     CHOLHDL 27.6 01/27/2023     CHOLHDL 3.3 04/23/2022     CHOLHDL 26.4 04/21/2022               Review of Systems   Constitutional: Negative for diaphoresis, night sweats, weight gain and weight loss.   HENT:  Negative for congestion.    Eyes:  Negative for blurred vision, discharge and double vision.   Cardiovascular:  Negative for chest pain, claudication, cyanosis, dyspnea on exertion, irregular heartbeat, leg swelling, near-syncope, orthopnea, palpitations, paroxysmal nocturnal dyspnea and syncope.   Respiratory:  Negative for cough, shortness of breath and wheezing.    Endocrine: Negative for cold intolerance, heat intolerance and polyphagia.   Hematologic/Lymphatic: Negative for adenopathy and bleeding problem. Does not bruise/bleed easily.   Skin:  Negative for dry skin and nail changes.   Musculoskeletal:  Negative for arthritis, back pain, falls, joint pain, myalgias and neck pain.   Gastrointestinal:  Negative for bloating, abdominal pain, change in bowel habit and constipation.   Genitourinary:  Negative for bladder incontinence, dysuria, flank pain, genital sores and missed menses.   Neurological:  Negative for aphonia, brief paralysis, difficulty with concentration, dizziness and weakness.   Psychiatric/Behavioral:  Negative for altered mental status and memory loss. The patient does not have insomnia.    Allergic/Immunologic: Negative for environmental allergies.      Objective:   Physical Exam  Constitutional:       Appearance: She is well-developed.      Interventions: She is not intubated.  HENT:      Head: Normocephalic and atraumatic.      Right Ear: External ear normal.      Left Ear: External ear normal.   Eyes:      General: No scleral icterus.        Right eye: No discharge.         Left eye: No discharge.      Conjunctiva/sclera: Conjunctivae normal.      Pupils: Pupils are equal, round, and reactive to light.   Neck:      Thyroid: No thyromegaly.      Vascular: Normal  carotid pulses. No carotid bruit, hepatojugular reflux or JVD.      Trachea: No tracheal deviation.   Cardiovascular:      Rate and Rhythm: Normal rate and regular rhythm. No extrasystoles are present.     Chest Wall: PMI is not displaced.      Pulses:           Carotid pulses are 2+ on the right side and 2+ on the left side.       Radial pulses are 2+ on the right side and 2+ on the left side.        Femoral pulses are 2+ on the right side and 2+ on the left side.       Popliteal pulses are 2+ on the right side and 2+ on the left side.        Dorsalis pedis pulses are 2+ on the right side and 2+ on the left side.        Posterior tibial pulses are 2+ on the right side and 2+ on the left side.      Heart sounds: S1 normal and S2 normal. Heart sounds not distant. No midsystolic click. No murmur heard.    No friction rub. No gallop. No S3 sounds.   Pulmonary:      Effort: Pulmonary effort is normal. No tachypnea, bradypnea, accessory muscle usage or respiratory distress. She is not intubated.      Breath sounds: Normal breath sounds. No stridor. No decreased breath sounds, wheezing or rales.   Chest:      Chest wall: No tenderness.   Abdominal:      General: There is no distension or abdominal bruit.      Palpations: There is no mass or pulsatile mass.      Tenderness: There is no abdominal tenderness. There is no guarding or rebound.   Musculoskeletal:         General: No tenderness. Normal range of motion.      Cervical back: Normal range of motion and neck supple.   Lymphadenopathy:      Cervical: No cervical adenopathy.      Comments:      1 to 2 + edema of both ankles           Skin:     General: Skin is warm.      Coloration: Skin is not pale.      Findings: No erythema or rash.      Comments:      Hyperpigmentation of both ankles R > L            Neurological:      Mental Status: She is alert and oriented to person, place, and time.      Cranial Nerves: No cranial nerve deficit.      Coordination: Coordination  normal.      Deep Tendon Reflexes: Reflexes are normal and symmetric.   Psychiatric:         Behavior: Behavior normal.         Thought Content: Thought content normal.         Judgment: Judgment normal.      2/2023                       Assessment:      1. Venous insufficiency of both lower extremities    2. Varicose veins of both legs with edema    3. Mixed hyperlipidemia    4. Pain of lower extremity, unspecified laterality             Plan:               We had a long discussion regarding the pathophysiology of venous insufficiency.  We discussed both superficial and deep venous reflux disease.  Initial treatment is usually conservative with compression stockings, exercise, weight loss, and calf muscle compressive therapy.  If conservative measures fail then we will proceed with superficial venous reflux disease management with ablative therapy.  If symptoms persist then we will proceed with evaluation of deep venous reflux disease.  This requires a venogram with intravascular ultrasound for guidance of intervention if clinically indicated.  The patient expressed verbal understanding of the plan.             She would like to proceed with right GSV ablation with Varithena           Exercise  Weight loss  Compression stockings 20-30 mmHg  Dietary changes  Calcium score for risk stratification   Lipid panel prior to follow up   Continue with crestor            Consider EVLT for superficial disease if there is no improvement with conservative. If there is no improvement after management superficial venous reflux disease, she will proceed with venogram. US in 2/2017 revealed bilateral GSV + deep venous reflux              Continue with current medical plan and lifestyle changes.  Return sooner for concerns or questions. If symptoms persist go to the ED  I have reviewed all pertinent data on this patient         I have reviewed the patient's medical history in detail and updated the computerized patient record.            Orders Placed This Encounter   Procedures    Case Request-Cath Lab: Ablation       Standing Status:   Standing       Number of Occurrences:   1       Order Specific Question:   CPT Code:       Answer:   CT ENDOVENOUS RF, 1ST VEIN [25121]       Order Specific Question:   CPT Code:       Answer:   CT ENDOVENOUS ABLATION THER CHEM ADHESIVE, 1ST VEIN [77141]       Order Specific Question:   CPT Code:       Answer:   CT INJ, NONCMPND FOAM SCLEROSANT, 1 VEIN [88719]       Order Specific Question:   CPT Code:       Answer:   CT  US GUIDE, VASCULAR ACCESS [21067]       Order Specific Question:   PAT Visit Needed?       Answer:   PAT Anesthesia Triage [105]       Order Specific Question:   Medical Necessity:       Answer:   Medically Urgent [101]       Order Specific Question:   Is an on-site pathologist required for this procedure?       Answer:   N/A         Follow up as scheduled. Return sooner for concerns or questions                 She expressed verbal understanding and agreed with the plan     Greater than 50% of the visit of 45 minutes was spent counseling, educating, and coordinating the care of the patient.                   Patient's Medications   New Prescriptions     No medications on file   Previous Medications     CARBAMAZEPINE (TEGRETOL XR) 200 MG 12 HR TABLET    Take 200 mg by mouth 2 (two) times daily.     MULTIVITAMIN WITH MINERALS TABLET    Take 1 tablet by mouth once daily.     OMEPRAZOLE (PRILOSEC) 40 MG CAPSULE    Take 1 capsule (40 mg total) by mouth once daily.     ROSUVASTATIN (CRESTOR) 40 MG TAB    TAKE 1 TABLET EVERY EVENING   Modified Medications     No medications on file   Discontinued Medications     No medications on file

## 2023-03-16 NOTE — NURSING
Pt discharged per MD order. Vitals stable. Pt ambulated in unit with no problems and dressed self. Left leg sites WNL, no swelling, bleeding, oozing, tenderness, or hematoma present. All discharge instructions given and pt verbalizes full understanding to all instructions and all questions answered. Pt ambulated out of recovery independently.

## 2023-03-16 NOTE — DISCHARGE INSTRUCTIONS
Recovering at home  Once at home, follow all the instructions youve been given. Be sure to:  Take all medicines as directed  Care for the catheter insertion site as directed  Check for signs of infection at the catheter insertion site: check for warmth, redness, yellow/green discharge from access site  Leave bandages in place for 2 days- DO NOT GET WET  Wear elastic stockings as directed: first 2 days wear stockings day and night. After the first 2 initials days, wear stocking during the day only for 14 days.  Walk a few times a day. Walk for at least 5 minutes every hours, while awake.  Avoid heavy exercise, lifting, and standing for long periods as advised  Avoid air travel, hot baths, saunas, or whirlpools for the next month

## 2023-03-31 NOTE — DISCHARGE SUMMARY
[facility] ([facility])  Discharge Note  Short Stay    Procedure(s) (LRB):  Ablation (Right)      OUTCOME: Patient tolerated treatment/procedure well without complication and is now ready for discharge.    DISPOSITION: Home or Self Care    FINAL DIAGNOSIS:  Venous insufficiency of both lower extremities    FOLLOWUP: In clinic    DISCHARGE INSTRUCTIONS:  No discharge procedures on file.        S/p right GSV ablation with Varithena       Discharge Medication List as of 3/16/2023  2:16 PM        CONTINUE these medications which have NOT CHANGED    Details   carBAMazepine (TEGRETOL XR) 200 MG 12 hr tablet Take 200 mg by mouth 2 (two) times daily., Starting Tue 3/15/2022, Historical Med      multivitamin with minerals tablet Take 1 tablet by mouth once daily., Historical Med      omeprazole (PRILOSEC) 40 MG capsule Take 1 capsule (40 mg total) by mouth once daily., Starting u 2/2/2023, Until Sun 1/28/2024, Normal      rosuvastatin (CRESTOR) 40 MG Tab TAKE 1 TABLET EVERY EVENING, Normal                TIME SPENT ON DISCHARGE: 35 minutes

## 2023-04-19 ENCOUNTER — TELEPHONE (OUTPATIENT)
Dept: CARDIOLOGY | Facility: CLINIC | Age: 63
End: 2023-04-19
Payer: COMMERCIAL

## 2023-04-19 NOTE — TELEPHONE ENCOUNTER
"I reached out to patient her phone line was busy    I scheduled her an appointment to see     And also put her on the "Waiting list to be seen sooner"      Nw                            ----- Message from Kimmie Jiang sent at 4/19/2023  3:23 PM CDT -----  Regarding: Call back  Contact: [phone]  Type:  Sooner Apoointment Request    Caller is requesting a sooner appointment.  Caller declined first available appointment listed below.  Caller will not accept being placed on the waitlist and is requesting a message be sent to doctor.  Name of Caller: PT   When is the first available appointment? July   Symptoms: Follow up test results   Would the patient rather a call back or a response via [facility]? Call back   Best Call Back Number: [phone]  Additional Information: Patient would like to see if she can be seen on 4/26/23 since her  will have an appointment also         "

## 2023-04-20 ENCOUNTER — HOSPITAL ENCOUNTER (OUTPATIENT)
Dept: CARDIOLOGY | Facility: HOSPITAL | Age: 63
Discharge: HOME | End: 2023-04-20
Attending: INTERNAL MEDICINE
Payer: COMMERCIAL

## 2023-04-20 DIAGNOSIS — I87.2 VENOUS INSUFFICIENCY: ICD-10-CM

## 2023-04-20 LAB
RIGHT GREAT SAPHENOUS DISTAL THIGH DIA: 0.68 CM
RIGHT GREAT SAPHENOUS JUNCTION DIA: 0.61 CM
RIGHT GREAT SAPHENOUS KNEE DIA: 0.46 CM
RIGHT GREAT SAPHENOUS MIDDLE THIGH DIA: 0.49 CM
RIGHT GREAT SAPHENOUS PROXIMAL CALF DIA: 0.38 CM
RIGHT SMALL SAPHENOUS KNEE DIA: 0.51 CM
RIGHT SMALL SAPHENOUS SPJ DIA: 0.49 CM

## 2023-04-20 PROCEDURE — 93971 CV US LOWER VENOUS INSUFFICIENCY RIGHT: ICD-10-PCS | Mod: 26,RT,, | Performed by: INTERNAL MEDICINE

## 2023-04-20 PROCEDURE — 93971 EXTREMITY STUDY: CPT | Mod: TC,RT

## 2023-04-20 PROCEDURE — 93971 EXTREMITY STUDY: CPT | Mod: 26,RT,, | Performed by: INTERNAL MEDICINE

## 2023-04-26 ENCOUNTER — TELEPHONE (OUTPATIENT)
Dept: CARDIOLOGY | Facility: CLINIC | Age: 63
End: 2023-04-26
Payer: COMMERCIAL

## 2023-04-26 DIAGNOSIS — I87.2 VENOUS INSUFFICIENCY OF BOTH LOWER EXTREMITIES: Primary | ICD-10-CM

## 2023-04-26 RX ORDER — ROSUVASTATIN CALCIUM 40 MG/1
40 TABLET, COATED ORAL NIGHTLY
Qty: 90 TABLET | Refills: 3 | Status: SHIPPED | OUTPATIENT
Start: 2023-04-26

## 2023-07-28 ENCOUNTER — PATIENT MESSAGE (OUTPATIENT)
Dept: CARDIOLOGY | Facility: CLINIC | Age: 63
End: 2023-07-28
Payer: COMMERCIAL

## 2023-08-23 ENCOUNTER — OFFICE VISIT (OUTPATIENT)
Dept: CARDIOLOGY | Facility: CLINIC | Age: 63
End: 2023-08-23
Payer: COMMERCIAL

## 2023-08-23 VITALS
WEIGHT: 156 LBS | SYSTOLIC BLOOD PRESSURE: 138 MMHG | HEIGHT: 63 IN | HEART RATE: 86 BPM | OXYGEN SATURATION: 100 % | BODY MASS INDEX: 27.64 KG/M2 | DIASTOLIC BLOOD PRESSURE: 80 MMHG

## 2023-08-23 DIAGNOSIS — I83.893 VARICOSE VEINS OF BOTH LEGS WITH EDEMA: ICD-10-CM

## 2023-08-23 DIAGNOSIS — E78.2 MIXED HYPERLIPIDEMIA: ICD-10-CM

## 2023-08-23 DIAGNOSIS — Z82.49 FAMILY HISTORY OF ISCHEMIC HEART DISEASE (IHD): ICD-10-CM

## 2023-08-23 DIAGNOSIS — I87.2 VENOUS INSUFFICIENCY OF BOTH LOWER EXTREMITIES: Primary | ICD-10-CM

## 2023-08-23 PROCEDURE — 99212 OFFICE O/P EST SF 10 MIN: CPT | Mod: S$GLB,,, | Performed by: INTERNAL MEDICINE

## 2023-08-23 PROCEDURE — 3075F PR MOST RECENT SYSTOLIC BLOOD PRESS GE 130-139MM HG: ICD-10-PCS | Mod: CPTII,S$GLB,, | Performed by: INTERNAL MEDICINE

## 2023-08-23 PROCEDURE — 3008F BODY MASS INDEX DOCD: CPT | Mod: CPTII,S$GLB,, | Performed by: INTERNAL MEDICINE

## 2023-08-23 PROCEDURE — 3008F PR BODY MASS INDEX (BMI) DOCUMENTED: ICD-10-PCS | Mod: CPTII,S$GLB,, | Performed by: INTERNAL MEDICINE

## 2023-08-23 PROCEDURE — 1159F PR MEDICATION LIST DOCUMENTED IN MEDICAL RECORD: ICD-10-PCS | Mod: CPTII,S$GLB,, | Performed by: INTERNAL MEDICINE

## 2023-08-23 PROCEDURE — 99999 PR PBB SHADOW E&M-EST. PATIENT-LVL III: CPT | Mod: PBBFAC,,, | Performed by: INTERNAL MEDICINE

## 2023-08-23 PROCEDURE — 3079F DIAST BP 80-89 MM HG: CPT | Mod: CPTII,S$GLB,, | Performed by: INTERNAL MEDICINE

## 2023-08-23 PROCEDURE — 99999 PR PBB SHADOW E&M-EST. PATIENT-LVL III: ICD-10-PCS | Mod: PBBFAC,,, | Performed by: INTERNAL MEDICINE

## 2023-08-23 PROCEDURE — 1159F MED LIST DOCD IN RCRD: CPT | Mod: CPTII,S$GLB,, | Performed by: INTERNAL MEDICINE

## 2023-08-23 PROCEDURE — 3079F PR MOST RECENT DIASTOLIC BLOOD PRESSURE 80-89 MM HG: ICD-10-PCS | Mod: CPTII,S$GLB,, | Performed by: INTERNAL MEDICINE

## 2023-08-23 PROCEDURE — 99212 PR OFFICE/OUTPT VISIT, EST, LEVL II, 10-19 MIN: ICD-10-PCS | Mod: S$GLB,,, | Performed by: INTERNAL MEDICINE

## 2023-08-23 PROCEDURE — 3075F SYST BP GE 130 - 139MM HG: CPT | Mod: CPTII,S$GLB,, | Performed by: INTERNAL MEDICINE

## 2023-08-24 NOTE — PROGRESS NOTES
Subjective:   Patient ID:  Nicole Perez is a 62 y.o. female who presents for evaluation and treatment of Venous Insufficiency, Edema, and Results      HPI:       She is here for follow-up of venous insufficiency.  She is status post right greater saphenous ablation with Varithena in March 2023.  Her surveillance ultrasound revealed a thrombosed greater saphenous vein.  She has residual right popliteal vein reflux.  She has intermittent edema lower extremity.  She works at home in the sitting position.  She is currently not wearing compression stockings regularly.  She has trace right lower extremity edema.  Otherwise feeling very well.        She also has HLP with less than 7.5 % 10 yr CV risk. Currently on statin therapy initiated and monitored by pcp.  She has a family history of ischemic heart disease and opted to continue statin.      CEAP 4c  VCSS 12        US 2/2017       No DVT      R CFV  + > 500 msec reflux    R GSV 4.1 mm + > 500 msec reflux   R LSV 6.1 mm -reflux     L POP + > 500 msec reflux   L GSV 3.6 mm + > 500 msec reflux   L LSV 5.5 mm -reflux      US 2/2019     R GSV 4.1 mm + reflux 2392 msec   R LSV 3.4 mm + reflux 2240 msec     L GSV 4.2 mm - reflux   L LSV 3.2 mm + reflux 2812 msec     No DVT       Echo 2/2019     Normal EF   Normal diastolic parameters   No valvular disease   Normal PA pressure      Echo 2/2021     Normal EF   Normal RV fn   PASP 28 mmHg       ECG 2/22/2021     NSR with non-specific changes          Venous US 1/2021      R GSV 7.4 mm + reflux 2544 msec   R LSV 5.2 mm + reflux 1883 msec     L GSV 6.6 mm - reflux    L LSV 6.1 mm + reflux 1835 msec     No DVT       US 1/2023     No DVT        R GSV + reflux > 500 msec; 7 mm    R LSV + reflux > 500 msec; 5 mm      L GSV + reflux > 500 msec; 5 mm   L LSV + reflux > 500 msec; 7 mm              The 10-year ASCVD risk score (Catalino BAY, et al., 2019) is: 3.7%    Values used to calculate the score:      Age: 62 years      Sex:  Female      Is Non- : No      Diabetic: No      Tobacco smoker: No      Systolic Blood Pressure: 138 mmHg      Is BP treated: No      HDL Cholesterol: 47 mg/dL      Total Cholesterol: 170 mg/dL      Patient Active Problem List    Diagnosis Date Noted    Family history of ischemic heart disease (IHD) 2021    Overweight (BMI 25.0-29.9) 2021    Gastroesophageal reflux disease without esophagitis 2021    Screening for malignant neoplasm of colon 2019    Venous insufficiency of both lower extremities 10/22/2018          2017       No DVT      R CFV  + > 500 msec reflux    R GSV 4.1 mm + > 500 msec reflux   R LSV 6.1 mm -reflux     L POP + > 500 msec reflux   L GSV 3.6 mm + > 500 msec reflux   L LSV 5.5 mm -reflux              Mixed hyperlipidemia 2018         As of 10/22/2018: 10 yr CV risk is 5.4%        Pre-diabetes 2018    Muscle spasms of both lower extremities 2018    Varicose veins of both legs with edema 2017    Pain of lower extremity 2017    History of smoking 2017           Right Arm BP - Sittin/80  Left Arm BP - Sittin/84        LABS      Lipid panel  Lab Results   Component Value Date    CHOL 170 2023    CHOL 151 2022    CHOL 159 2022     Lab Results   Component Value Date    HDL 47 2023    HDL 46 (L) 2022    HDL 42 2022     Lab Results   Component Value Date    LDLCALC 88.2 2023    LDLCALC 72 2022    LDLCALC 68.4 2022     Lab Results   Component Value Date    TRIG 174 (H) 2023    TRIG 238 (H) 2022    TRIG 243 (H) 2022     Lab Results   Component Value Date    CHOLHDL 27.6 2023    CHOLHDL 3.3 2022    CHOLHDL 26.4 2022            Review of Systems   Constitutional: Negative for diaphoresis, night sweats, weight gain and weight loss.   HENT:  Negative for congestion.    Eyes:  Negative for blurred vision, discharge and  double vision.   Cardiovascular:  Negative for chest pain, claudication, cyanosis, dyspnea on exertion, irregular heartbeat, leg swelling, near-syncope, orthopnea, palpitations, paroxysmal nocturnal dyspnea and syncope.   Respiratory:  Negative for cough, shortness of breath and wheezing.    Endocrine: Negative for cold intolerance, heat intolerance and polyphagia.   Hematologic/Lymphatic: Negative for adenopathy and bleeding problem. Does not bruise/bleed easily.   Skin:  Negative for dry skin and nail changes.   Musculoskeletal:  Negative for arthritis, back pain, falls, joint pain, myalgias and neck pain.   Gastrointestinal:  Negative for bloating, abdominal pain, change in bowel habit and constipation.   Genitourinary:  Negative for bladder incontinence, dysuria, flank pain, genital sores and missed menses.   Neurological:  Negative for aphonia, brief paralysis, difficulty with concentration, dizziness and weakness.   Psychiatric/Behavioral:  Negative for altered mental status and memory loss. The patient does not have insomnia.    Allergic/Immunologic: Negative for environmental allergies.       Objective:   Physical Exam  Constitutional:       Appearance: She is well-developed.      Interventions: She is not intubated.  HENT:      Head: Normocephalic and atraumatic.      Right Ear: External ear normal.      Left Ear: External ear normal.   Eyes:      General: No scleral icterus.        Right eye: No discharge.         Left eye: No discharge.      Conjunctiva/sclera: Conjunctivae normal.      Pupils: Pupils are equal, round, and reactive to light.   Neck:      Thyroid: No thyromegaly.      Vascular: Normal carotid pulses. No carotid bruit, hepatojugular reflux or JVD.      Trachea: No tracheal deviation.   Cardiovascular:      Rate and Rhythm: Normal rate and regular rhythm. No extrasystoles are present.     Chest Wall: PMI is not displaced.      Pulses:           Carotid pulses are 2+ on the right side and 2+  on the left side.       Radial pulses are 2+ on the right side and 2+ on the left side.        Femoral pulses are 2+ on the right side and 2+ on the left side.       Popliteal pulses are 2+ on the right side and 2+ on the left side.        Dorsalis pedis pulses are 2+ on the right side and 2+ on the left side.        Posterior tibial pulses are 2+ on the right side and 2+ on the left side.      Heart sounds: S1 normal and S2 normal. Heart sounds not distant. No midsystolic click. No murmur heard.     No friction rub. No gallop. No S3 sounds.   Pulmonary:      Effort: Pulmonary effort is normal. No tachypnea, bradypnea, accessory muscle usage or respiratory distress. She is not intubated.      Breath sounds: Normal breath sounds. No stridor. No decreased breath sounds, wheezing or rales.   Chest:      Chest wall: No tenderness.   Abdominal:      General: There is no distension or abdominal bruit.      Palpations: There is no mass or pulsatile mass.      Tenderness: There is no abdominal tenderness. There is no guarding or rebound.   Musculoskeletal:         General: No tenderness. Normal range of motion.      Cervical back: Normal range of motion and neck supple.   Lymphadenopathy:      Cervical: No cervical adenopathy.      Comments:     1 to 2 + edema of both ankles         Skin:     General: Skin is warm.      Coloration: Skin is not pale.      Findings: No erythema or rash.      Comments:     Hyperpigmentation of both ankles R > L          Neurological:      Mental Status: She is alert and oriented to person, place, and time.      Cranial Nerves: No cranial nerve deficit.      Coordination: Coordination normal.      Deep Tendon Reflexes: Reflexes are normal and symmetric.   Psychiatric:         Behavior: Behavior normal.         Thought Content: Thought content normal.         Judgment: Judgment normal.       2/2023                  Assessment:     1. Venous insufficiency of both lower extremities    2. Varicose  veins of both legs with edema    3. Mixed hyperlipidemia    4. Family history of ischemic heart disease (IHD)          Plan:           We had a long discussion regarding the pathophysiology of venous insufficiency.  We discussed both superficial and deep venous reflux disease.  Initial treatment is usually conservative with compression stockings, exercise, weight loss, and calf muscle compressive therapy.  If conservative measures fail then we will proceed with superficial venous reflux disease management with ablative therapy.  If symptoms persist then we will proceed with evaluation of deep venous reflux disease.  This requires a venogram with intravascular ultrasound for guidance of intervention if clinically indicated.  The patient expressed verbal understanding of the plan.          She will continue with compression stockings.  She will continue with regular exercise program. Follow-up in a year with an ultrasound prior to visit.        Dietary changes with low-salt diet  Calcium score for risk stratification   Lipid panel prior to follow up   Continue with crestor             Continue with current medical plan and lifestyle changes.  Return sooner for concerns or questions. If symptoms persist go to the ED  I have reviewed all pertinent data on this patient       I have reviewed the patient's medical history in detail and updated the computerized patient record.    Orders Placed This Encounter   Procedures    CV US Lower Extremity Veins Bilateral Insufficiency     Standing Status:   Future     Standing Expiration Date:   8/24/2024     Order Specific Question:   Release to patient     Answer:   Immediate       Follow up as scheduled. Return sooner for concerns or questions            She expressed verbal understanding and agreed with the plan    Greater than 50% of the visit of 45 minutes was spent counseling, educating, and coordinating the care of the patient.          Patient's Medications   New  Prescriptions    No medications on file   Previous Medications    CARBAMAZEPINE (TEGRETOL XR) 200 MG 12 HR TABLET    Take 200 mg by mouth 2 (two) times daily.    MULTIVITAMIN WITH MINERALS TABLET    Take 1 tablet by mouth once daily.    OMEPRAZOLE (PRILOSEC) 40 MG CAPSULE    Take 1 capsule (40 mg total) by mouth once daily.    ROSUVASTATIN (CRESTOR) 40 MG TAB    Take 1 tablet (40 mg total) by mouth every evening.   Modified Medications    No medications on file   Discontinued Medications    No medications on file

## 2023-10-12 ENCOUNTER — PATIENT MESSAGE (OUTPATIENT)
Dept: RESPIRATORY THERAPY | Facility: HOSPITAL | Age: 63
End: 2023-10-12
Payer: COMMERCIAL

## 2024-01-12 ENCOUNTER — HOSPITAL ENCOUNTER (OUTPATIENT)
Dept: RADIOLOGY | Facility: HOSPITAL | Age: 64
Discharge: HOME | End: 2024-01-12
Attending: OBSTETRICS & GYNECOLOGY
Payer: COMMERCIAL

## 2024-01-12 ENCOUNTER — PATIENT MESSAGE (OUTPATIENT)
Dept: OBSTETRICS AND GYNECOLOGY | Facility: CLINIC | Age: 64
End: 2024-01-12
Payer: COMMERCIAL

## 2024-01-12 DIAGNOSIS — Z12.31 BREAST CANCER SCREENING BY MAMMOGRAM: ICD-10-CM

## 2024-01-12 PROCEDURE — 77067 SCR MAMMO BI INCL CAD: CPT | Mod: 26,,, | Performed by: RADIOLOGY

## 2024-01-12 PROCEDURE — 77067 SCR MAMMO BI INCL CAD: CPT | Mod: TC

## 2024-01-12 PROCEDURE — 77063 BREAST TOMOSYNTHESIS BI: CPT | Mod: 26,,, | Performed by: RADIOLOGY

## 2024-06-04 NOTE — PROGRESS NOTES
"  Subjective:       Patient ID: Nicole Perez is a 62 y.o. female.    Chief Complaint:  No chief complaint on file.      History of Present Illness  HPI  Annual Exam-Postmenopausal  Patient presents for annual exam. The patient has no complaints today. The patient is sexually active. GYN screening history: last pap: was normal and last mammogram: was normal. The patient is taking hormone replacement therapy. Patient denies post-menopausal vaginal bleeding. The patient wears seatbelts: yes. The patient participates in regular exercise: yes. Has the patient ever been transfused or tattooed?: not asked. The patient reports that there is not domestic violence in her life.     Having pelvic pain with intercourse.    GYN & OB History  No LMP recorded. Patient has had a hysterectomy.   Date of Last Pap: 2021    OB History    Para Term  AB Living   2 2 2     2   SAB IAB Ectopic Multiple Live Births           2      # Outcome Date GA Lbr Andrea/2nd Weight Sex Delivery Anes PTL Lv   2 Term    3.629 kg (8 lb) M CS-LTranv EPI N BARBARA   1 Term    3.629 kg (8 lb) M CS-LTranv EPI N BARBARA     Past Medical History:  Past Medical History:   Diagnosis Date    Abdominal bloating 2017    Abnormal Pap smear of cervix     Elevated liver function tests 2017    Foot swelling 2017    Bilateral    GERD (gastroesophageal reflux disease)     History of colon polyps     History of Helicobacter pylori infection     History of hematuria 2017    Hypercholesteremia 2017    Lactose intolerance 2017    Prediabetes 2017    Spasm of muscle     Diffuse muscle spasms "for many years." On meds for this.       Past Surgical History:  Past Surgical History:   Procedure Laterality Date    breast reduction Bilateral      SECTION      CHOLECYSTECTOMY      COLONOSCOPY N/A 2019    Procedure: COLONOSCOPY/Golytely;  Surgeon: Angela Staples MD;  Location: [facility];  Service: Endoscopy;  " Laterality: N/A;    COLONOSCOPY W/ POLYPECTOMY      HYSTERECTOMY      TOTAL ABDOMINAL HYSTERECTOMY W/ BILATERAL SALPINGOOPHORECTOMY      TOTAL REDUCTION MAMMOPLASTY      tummy tuck Bilateral        Family History:  Family History   Problem Relation Age of Onset    Heart disease Father     Hypertension Father     Heart disease Mother     Hypertension Mother        Allergies:  Review of patient's allergies indicates:  No Known Allergies    Medications:  Current Outpatient Medications on File Prior to Visit   Medication Sig Dispense Refill    carBAMazepine (TEGRETOL XR) 200 MG 12 hr tablet Take 200 mg by mouth 2 (two) times daily.      HYDROcodone-acetaminophen (NORCO)  mg per tablet Take 1 tablet by mouth every 6 (six) hours as needed.      multivitamin with minerals tablet Take 1 tablet by mouth once daily.      omeprazole (PRILOSEC) 40 MG capsule Take 1 capsule (40 mg total) by mouth once daily. 30 capsule 2    rosuvastatin (CRESTOR) 40 MG Tab TAKE 1 TABLET EVERY EVENING 90 tablet 3     No current facility-administered medications on file prior to visit.       Social History:  Social History     Tobacco Use    Smoking status: Former     Packs/day: 0.50     Years: 25.00     Pack years: 12.50     Types: Cigarettes     Quit date: 2008     Years since quittin.9    Smokeless tobacco: Never   Substance Use Topics    Alcohol use: No    Drug use: No            Review of Systems  Review of Systems   Constitutional: Negative.    HENT: Negative.     Eyes: Negative.    Respiratory: Negative.     Cardiovascular: Negative.    Gastrointestinal: Negative.    Endocrine: Negative.    Genitourinary: Negative.    Musculoskeletal: Negative.    Integumentary:  Negative.   Neurological: Negative.    Hematological: Negative.    Psychiatric/Behavioral: Negative.     Breast: negative.          Objective:    Physical Exam:   Constitutional: She is oriented to person, place, and time. She appears well-nourished.    HENT:    Head: Normocephalic and atraumatic.    Eyes: EOM are normal. Right eye exhibits normal extraocular motion. Left eye exhibits normal extraocular motion.    Neck: No thyromegaly present.    Cardiovascular:  Normal rate.             Pulmonary/Chest: Effort normal. No respiratory distress. Right breast exhibits no mass, no skin change and no tenderness. Left breast exhibits no mass, no skin change and no tenderness. Breasts are symmetrical.        Abdominal: Soft. She exhibits no distension and no mass. There is no abdominal tenderness. Hernia confirmed negative in the right inguinal area and confirmed negative in the left inguinal area.     Genitourinary:    Vagina normal.      Pelvic exam was performed with patient supine.   Labial bartholins normal.There is no rash or lesion on the right labia. There is no rash or lesion on the left labia. Cervix is normal. Right adnexum displays no tenderness and no fullness. Left adnexum displays no tenderness and no fullness. Vaginal cuff normal.  No  no vaginal discharge, bleeding or unspecified prolapse of vaginal walls in the vagina. Uterus is absent.           Musculoskeletal: Normal range of motion.      Lymphadenopathy:     She has no cervical adenopathy.    Neurological: She is oriented to person, place, and time.   Cranial Nerves II-XII grossly intact.    Skin: No rash noted. No erythema.    Psychiatric: She has a normal mood and affect. Her behavior is normal.        Assessment:        1. Encounter for gynecological examination without abnormal finding    2. Breast cancer screening by mammogram                Plan:      1. Encounter for gynecological examination without abnormal finding  - Pap due in 2 years.  -   Screening tests as ordered.  - Calcium and vitamin D recommended.     Counseling: Perimenopause/Menopause  Stress management techniques  indications for and frequency of periodic gynecologic exam  reviewed current Pap guidelines. Explained new understanding of  natural history of cervical disease and improved Paps. Recommended guideline concordant care.     2. Breast cancer screening by mammogram  - Mammo Digital Screening Bilat w/ Larry; Future    3. Pelvic pain  - Pelvic ultrasound ordered.  Will call patient with results.  - Patient instructed to call office if she has not heard anything 2 wks after ultrasound.   - US Pelvis Comp with Transvag NON-OB (xpd; Future         Detail Level: Detailed Quality 110: Preventive Care And Screening: Influenza Immunization: Influenza Immunization previously received during influenza season Quality 130: Documentation Of Current Medications In The Medical Record: Current Medications Documented Quality 431: Preventive Care And Screening: Unhealthy Alcohol Use - Screening: Patient not identified as an unhealthy alcohol user when screened for unhealthy alcohol use using a systematic screening method Quality 402: Tobacco Use And Help With Quitting Among Adolescents: Patient screened for tobacco and never smoked

## 2024-06-10 ENCOUNTER — PATIENT MESSAGE (OUTPATIENT)
Dept: INTERNAL MEDICINE | Facility: CLINIC | Age: 64
End: 2024-06-10
Payer: COMMERCIAL

## (undated) DEVICE — PACK RF ABLATION PROCEDURE

## (undated) DEVICE — COVER PROBE US 5.5X58L NON LTX

## (undated) DEVICE — KIT MICRO INTRODUCER 4F .018IN

## (undated) DEVICE — PACK ADMIN VARITHENA UNIVERSAL

## (undated) DEVICE — COVER BAND BAG 40 X 40